# Patient Record
Sex: FEMALE | Race: WHITE | NOT HISPANIC OR LATINO | Employment: FULL TIME | URBAN - METROPOLITAN AREA
[De-identification: names, ages, dates, MRNs, and addresses within clinical notes are randomized per-mention and may not be internally consistent; named-entity substitution may affect disease eponyms.]

---

## 2017-12-20 ENCOUNTER — TRANSCRIBE ORDERS (OUTPATIENT)
Dept: URGENT CARE | Facility: CLINIC | Age: 52
End: 2017-12-20
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
## 2017-12-20 DIAGNOSIS — S83.511A SPRAIN OF ANTERIOR CRUCIATE LIGAMENT OF RIGHT KNEE, INITIAL ENCOUNTER: ICD-10-CM

## 2017-12-20 DIAGNOSIS — S16.1XXA REPETITIVE STRAIN INJURY OF CERVICAL SPINE, INITIAL ENCOUNTER: ICD-10-CM

## 2017-12-20 DIAGNOSIS — S46.811A TRAPEZIUS STRAIN, RIGHT, INITIAL ENCOUNTER: ICD-10-CM

## 2017-12-20 DIAGNOSIS — S46.911A RIGHT SHOULDER STRAIN, INITIAL ENCOUNTER: Primary | ICD-10-CM

## 2018-01-08 ENCOUNTER — TREATMENT (OUTPATIENT)
Dept: PHYSICAL THERAPY | Facility: CLINIC | Age: 53
End: 2018-01-08

## 2018-01-08 DIAGNOSIS — M25.511 ACUTE PAIN OF RIGHT SHOULDER: Primary | ICD-10-CM

## 2018-01-08 PROCEDURE — 97530 THERAPEUTIC ACTIVITIES: CPT | Performed by: PHYSICAL THERAPIST

## 2018-01-08 PROCEDURE — 97140 MANUAL THERAPY 1/> REGIONS: CPT | Performed by: PHYSICAL THERAPIST

## 2018-01-08 PROCEDURE — 97001 PR PHYS THERAPY EVALUATION: CPT | Performed by: PHYSICAL THERAPIST

## 2018-01-08 PROCEDURE — 97035 APP MDLTY 1+ULTRASOUND EA 15: CPT | Performed by: PHYSICAL THERAPIST

## 2018-01-08 PROCEDURE — 97110 THERAPEUTIC EXERCISES: CPT | Performed by: PHYSICAL THERAPIST

## 2018-01-08 NOTE — PROGRESS NOTES
Physical Therapy Initial Evaluation and Plan of Care        Subjective Evaluation    History of Present Illness  Date of onset: 12/18/2017  Mechanism of injury: Patient reports that she was helping a coworker with an overhead tasks and the unit gave way and it hyperextended her arm backwards. Overall feeling better but still not 100%      Patient Occupation: [employer] Quality of life: good    Pain  Current pain rating: 3  Quality: dull ache and squeezing  Aggravating factors: overhead activity, outstretched reach, repetitive movement and lifting    Hand dominance: right    Treatments  Previous treatment: medication  Patient Goals  Patient goals for therapy: return to work, independence with ADLs/IADLs, increased motion, decreased pain and increased strength             Objective       Postural Observations  Seated posture: fair  Standing posture: fair  Correction of posture: makes symptoms worse        Cervical/Thoracic Screen   Cervical range of motion within normal limits with the following exceptions: Mild pain at end cervical range specifically in flexion and left rotation/sidebending.     Active Range of Motion     Right Shoulder   Flexion: 150 degrees   Abduction: 150 degrees   External rotation 90°: 45 degrees  Internal rotation 90°: 45 degrees     Scapular Mobility     Right Shoulder   Scapular mobility: fair    Joint Play     Right Shoulder  Hypomobile in the posterior capsule and inferior capsule.     Strength/Myotome Testing     Right Shoulder     Planes of Motion   Flexion: 4+   Abduction: 4-   External rotation at 0°: 4-   Internal rotation at 0°: 4+     Tests     Right Shoulder   Positive active compression ([place]) and painful arc.   Negative empty can, full can and Neer's.          Assessment & Plan     Assessment  Impairments: abnormal or restricted ROM, activity intolerance, lacks appropriate home exercise program and pain with function  Assessment details: Patient is a 52 year old male who  presents with increased right shoulder pain. Upon assessment she has bilateral rounded shoulders, inferior and posterior capsular tightness, end range of motion limitation, weakness noted in abduction and ER.  +compression, -Full can  Prognosis: good  Prognosis details: Long Term Goals (4 weeks)    Patient is able to return to work with minimal to no discomfort  Patient is able to lift 45 lbs from floor to waist  Patient is able to lift 25 lbs from waist to shoulder  Patient is able to lift 15 lbs from shoulder to overhead      Short Term Goals (2 weeks)    Patient is independent with HEP  Patient is able to sleep without being disrupted by pain.   Patient has full AROM/PROM of right shoulder  Patient has greater than 50% improvement overall.   Patient is able to reach into overhead cabinets with minimal discomfort            Functional Limitations: lifting, pulling, pushing and reaching overhead  Plan  Therapy options: will be seen for skilled physical therapy services  Planned modality interventions: TENS, ultrasound, thermotherapy (hydrocollator packs) and cryotherapy  Planned therapy interventions: manual therapy, soft tissue mobilization, strengthening, stretching, therapeutic activities, joint mobilization, home exercise program, functional ROM exercises, flexibility and body mechanics training  Frequency: 3x week  Duration in weeks: 4  Treatment plan discussed with: patient        Manual Therapy:    10     mins  70187;  Therapeutic Exercise:    20     mins  57799;     Ultrasound  8 minutes      Timed Treatment:   38   mins   Total Treatment:     60   mins    PT SIGNATURE: Aleksandra Chowdhury, PT   DATE TREATMENT INITIATED: 1/8/2018    Initial Certification  Certification Period: 4/8/2018  I certify that the therapy services are furnished while this patient is under my care.  The services outlined above are required by this patient, and will be reviewed every 90 days.     PHYSICIAN: Christina Ayers,  MD      DATE:     Please sign and return via fax to [phone].. Thank you, [facility].

## 2018-01-10 ENCOUNTER — TREATMENT (OUTPATIENT)
Dept: PHYSICAL THERAPY | Facility: CLINIC | Age: 53
End: 2018-01-10

## 2018-01-10 DIAGNOSIS — M25.511 ACUTE PAIN OF RIGHT SHOULDER: Primary | ICD-10-CM

## 2018-01-10 PROCEDURE — 97140 MANUAL THERAPY 1/> REGIONS: CPT | Performed by: PHYSICAL THERAPIST

## 2018-01-10 PROCEDURE — 97035 APP MDLTY 1+ULTRASOUND EA 15: CPT | Performed by: PHYSICAL THERAPIST

## 2018-01-10 PROCEDURE — 97110 THERAPEUTIC EXERCISES: CPT | Performed by: PHYSICAL THERAPIST

## 2018-01-10 NOTE — PROGRESS NOTES
Re-Assessment / Re-Certification    Time In 137 Time Out 218    Patient: Roula Willard   : 1965  Diagnosis/ICD-10 Code:  Acute pain of right shoulder [M25.511]  Referring practitioner: Christina Ayers MD  Date of Initial Visit: 1/10/2018  Today's Date: 1/10/2018  Patient seen for 2 sessions      Subjective:   Roula Willard reports: that the shoulder feels better, states that it waked her up at night (pain 8-9/10), rates the pain at 3-4/10.    Treatment has included: therapeutic exercise, manual therapy and ultrasound    Subjective   Objective       Palpation     Additional Palpation Details  TTP to right LHB tendon, SS tendon and to the posterior shoulder    Active Range of Motion     Right Shoulder   Flexion: 142 degrees   Abduction: 141 degrees   External rotation 0°: 63 degrees   Internal rotation BTB: T8     Additional Active Range of Motion Details  Pain with all planes    Strength/Myotome Testing     Right Shoulder     Planes of Motion   Flexion: 4   Abduction: 4+   External rotation at 0°: 4   Internal rotation at 0°: 5     Tests     Right Shoulder   Positive active compression ([place]), empty can and Hawkin's.      Assessment/Plan  Patient has tolerated PT well thus far, but no significant progress has been made secondary to only being seen 2 times in PT.  Deficits persist with regard to pain, TTP, AROM, special testing and function.      PT Signature: James Leary, PT        Manual Therapy:    8     mins  51284;  Therapeutic Exercise:    24     mins  46588;     Neuromuscular Jamar:    0    mins  73695;    Therapeutic Activity:     0     mins  46604;     Gait Trainin     mins  60322;     Ultrasound:     8     mins  70121;    Work Hardening           0      mins 73620  Iontophoresis               0   mins 80048    Timed Treatment:   40   mins   Total Treatment:     41   mins

## 2018-01-31 ENCOUNTER — DOCUMENTATION (OUTPATIENT)
Dept: PHYSICAL THERAPY | Facility: CLINIC | Age: 53
End: 2018-01-31

## 2018-01-31 NOTE — PROGRESS NOTES
Discharge Summary  Discharge Summary from Physical Therapy Report      Dates  PT visit: 1/8 to 1/10/18  Number of Visits: 2     Discharge Status of Patient: See MD Note dated 1/10/18    Goals: Not Met    Discharge Plan: Refer to other services (specify):likely MRI    Comments Patient did not return to PT, likely referred out for MRI.    Date of Discharge 1/31/18        James Leary, PT  Physical Therapist

## 2018-02-12 ENCOUNTER — TRANSCRIBE ORDERS (OUTPATIENT)
Dept: PHYSICAL THERAPY | Facility: CLINIC | Age: 53
End: 2018-02-12

## 2018-02-12 DIAGNOSIS — S42.295D TRAUMATIC CLOSED NONDISPLACED FRACTURE OF ANATOMICAL NECK OF HUMERUS WITH ROUTINE HEALING, LEFT: Primary | ICD-10-CM

## 2018-03-02 ENCOUNTER — TREATMENT (OUTPATIENT)
Dept: PHYSICAL THERAPY | Facility: CLINIC | Age: 53
End: 2018-03-02

## 2018-03-02 DIAGNOSIS — M25.511 RIGHT SHOULDER PAIN, UNSPECIFIED CHRONICITY: ICD-10-CM

## 2018-03-02 DIAGNOSIS — M54.2 PAIN, NECK: Primary | ICD-10-CM

## 2018-03-02 PROCEDURE — 97110 THERAPEUTIC EXERCISES: CPT | Performed by: PHYSICAL THERAPIST

## 2018-03-02 PROCEDURE — 97002 PR PHYS THERAPY RE-EVALUATION: CPT | Performed by: PHYSICAL THERAPIST

## 2018-03-02 PROCEDURE — 97014 ELECTRIC STIMULATION THERAPY: CPT | Performed by: PHYSICAL THERAPIST

## 2018-03-02 NOTE — PROGRESS NOTES
Physical Therapy Initial Evaluation and Plan of Care    Time In  331  Time Out  415    Subjective Evaluation    History of Present Illness  Date of onset: 2017  Mechanism of injury: Patient was working [place] and something heavy came down on right shoulder.      Patient Occupation: [employer]   Precautions and Work Restrictions: normal job dutiesPain  Current pain ratin  At worst pain ratin  Location: right posterior shoulder and scaplua  Quality: knife-like and throbbing  Relieving factors: rest  Aggravating factors: overhead activity  Progression: improved    Hand dominance: right    Diagnostic Tests  MRI studies: abnormal (2 small tears)    Treatments  Previous treatment: injection treatment  Patient Goals  Patient goal: get rid of pain           Objective       Palpation     Additional Palpation Details  TTP to right posterior RC tendons, LHB and SS tendons.    Active Range of Motion     Right Shoulder   Flexion: 133 degrees with pain  Abduction: 120 degrees with pain  External rotation 0°: 72 degrees   Internal rotation BTB: L1 with pain    Strength/Myotome Testing     Right Shoulder     Planes of Motion   Flexion: 4+   Abduction: 5   External rotation at 0°: 4-   Internal rotation at 0°: 4-     Tests     Right Shoulder   Positive active compression ([place]), empty can and Hawkin's.          Assessment & Plan     Assessment  Impairments: abnormal or restricted ROM, activity intolerance, impaired physical strength, lacks appropriate home exercise program and pain with function  Assessment details: Patient presents with c/o pain, TTP, limited AROM, decreased strength and positive special testing which is limiting her ability to perform ADL'S.  Barriers to therapy: none  Prognosis: good  Prognosis details: STG's to be met by 2 weeks  1)  Independent with HEP  2)  Decrease pain by 50% or more  3)  AROM WNL for right shoulder without pain    LTG's to be met by 4 weeks  1)  Independent with HEP progression  2)   Decrease pain by 75% or more  3)  Increase strength for right shoulder to 4+/5  4)  Negative special testing  5)  No TTP to right shoulder  6)  Patient to lift waist to shoulder up to 30 lbs  7)  Patient to perform ADL'S without limitations       Plan  Therapy options: will be seen for skilled physical therapy services  Planned modality interventions: ultrasound, TENS and cryotherapy  Planned therapy interventions: strengthening, stretching, therapeutic activities, home exercise program and manual therapy  Frequency: 3x week  Duration in weeks: 8  Treatment plan discussed with: patient        Manual Therapy:    0     mins  76051;  Therapeutic Exercise:    19     mins  55231;     Neuromuscular Jamar:    0    mins  96155;    Therapeutic Activity:     0     mins  93092;     Gait Trainin     mins  38673;     Ultrasound:     0     mins  51992;    Work Hardening           0      mins 59778  Iontophoresis               0   mins 13051    Timed Treatment:   19   mins   Total Treatment:     44   mins    PT SIGNATURE: James Leary, PT   DATE TREATMENT INITIATED: 3/2/2018    Initial Certification  Certification Period: 2018  I certify that the therapy services are furnished while this patient is under my care.  The services outlined above are required by this patient, and will be reviewed every 90 days.     PHYSICIAN: Benjamin Mays MD      DATE:     Please sign and return via fax to [phone].. Thank you, [facility].

## 2018-03-07 ENCOUNTER — TREATMENT (OUTPATIENT)
Dept: PHYSICAL THERAPY | Facility: CLINIC | Age: 53
End: 2018-03-07

## 2018-03-07 DIAGNOSIS — M54.2 PAIN, NECK: Primary | ICD-10-CM

## 2018-03-07 PROCEDURE — 97110 THERAPEUTIC EXERCISES: CPT | Performed by: PHYSICAL THERAPIST

## 2018-03-07 NOTE — PROGRESS NOTES
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  She states that most of the pain seems to be in her shoulder blade.         Objective   See Exercise, Manual, and Modality Logs for complete treatment.       Assessment & Plan     Assessment  Assessment details: Patient tolerated treatment fairly well.  She has significant tightness along upper trap and thoracic spine. Discussed pain potentially coming from thoracic spine and the need for rest and stretching and how excessively pulling, pushing and lifting are aggravating it.    Plan  Plan details: Progress as tolerated.                      Manual Therapy:    5     mins  84229;  Therapeutic Exercise:    30     mins  88567;       Timed Treatment:   35   mins   Total Treatment:     35   mins    Aleksandra Chowdhury, PT  Physical Therapist

## 2018-03-09 ENCOUNTER — TREATMENT (OUTPATIENT)
Dept: PHYSICAL THERAPY | Facility: CLINIC | Age: 53
End: 2018-03-09

## 2018-03-09 DIAGNOSIS — M54.2 PAIN, NECK: Primary | ICD-10-CM

## 2018-03-09 DIAGNOSIS — M25.511 RIGHT SHOULDER PAIN, UNSPECIFIED CHRONICITY: ICD-10-CM

## 2018-03-09 PROCEDURE — 97110 THERAPEUTIC EXERCISES: CPT | Performed by: PHYSICAL THERAPIST

## 2018-03-09 PROCEDURE — 97014 ELECTRIC STIMULATION THERAPY: CPT | Performed by: PHYSICAL THERAPIST

## 2018-03-09 NOTE — PROGRESS NOTES
Physical Therapy Daily Progress Note    Time In 134  Time Out 227        Subjective  Patient reports that the shoulder is feeling a lot better, but reports that the shoulder blade is bothering her (pain rated at 7-8/10).    Objective   See Exercise, Manual, and Modality Logs for complete treatment.       Assessment/Plan  Subjective reports of pain remain elevated.  The majority of the patient's c/o is with the scapula, not the right shoulder or cervical spine.  No significant increase in the routine today secondary to increased subjective c/o today.                   Manual Therapy:    0     mins  74125;  Therapeutic Exercise:    38     mins  97942;     Neuromuscular Jamar:    0    mins  60688;    Therapeutic Activity:     0     mins  64591;     Gait Trainin     mins  97343;     Ultrasound:     0     mins  22434;    Work Hardening           0      mins 56397  Iontophoresis               0   mins 13248    Timed Treatment:   38   mins   Total Treatment:     53   mins    James Leary, PT  Physical Therapist

## 2018-03-12 ENCOUNTER — TREATMENT (OUTPATIENT)
Dept: PHYSICAL THERAPY | Facility: CLINIC | Age: 53
End: 2018-03-12

## 2018-03-12 DIAGNOSIS — M25.511 RIGHT SHOULDER PAIN, UNSPECIFIED CHRONICITY: ICD-10-CM

## 2018-03-12 DIAGNOSIS — M54.2 PAIN, NECK: Primary | ICD-10-CM

## 2018-03-12 PROCEDURE — 97014 ELECTRIC STIMULATION THERAPY: CPT | Performed by: PHYSICAL THERAPIST

## 2018-03-12 PROCEDURE — 97110 THERAPEUTIC EXERCISES: CPT | Performed by: PHYSICAL THERAPIST

## 2018-03-12 NOTE — PROGRESS NOTES
Physical Therapy Daily Progress Note    Time In 145  Time Out 232        Subjective  Patient reports that the shoulder blade and shoulder are bothering her today, currently rates the pain at 6-7/10 in the shoulder.    Objective   See Exercise, Manual, and Modality Logs for complete treatment.       Assessment/Plan  Subjective reports of pain remain elevated.  Patient reported pain in the superior aspect of the shoulder and scapula with some of the exercises.                     Manual Therapy:    0     mins  33372;  Therapeutic Exercise:    30     mins  70246;     Neuromuscular Jamar:    0    mins  77605;    Therapeutic Activity:     0     mins  10028;     Gait Trainin     mins  06887;     Ultrasound:     0     mins  97837;    Work Hardening           0      mins 14130  Iontophoresis               0   mins 50040    Timed Treatment:   30   mins   Total Treatment:     47   mins    James Leary, PT  Physical Therapist

## 2018-03-14 ENCOUNTER — TREATMENT (OUTPATIENT)
Dept: PHYSICAL THERAPY | Facility: CLINIC | Age: 53
End: 2018-03-14

## 2018-03-14 DIAGNOSIS — M54.2 PAIN, NECK: ICD-10-CM

## 2018-03-14 DIAGNOSIS — M25.511 RIGHT SHOULDER PAIN, UNSPECIFIED CHRONICITY: Primary | ICD-10-CM

## 2018-03-14 PROCEDURE — 97110 THERAPEUTIC EXERCISES: CPT | Performed by: PHYSICAL THERAPIST

## 2018-03-14 PROCEDURE — 97014 ELECTRIC STIMULATION THERAPY: CPT | Performed by: PHYSICAL THERAPIST

## 2018-03-14 NOTE — PROGRESS NOTES
Physical Therapy Daily Progress Note    Time In 131  Time Out 222        Subjective  Patient reports that she is feeling pretty good secondary to not doing a whole lot of work today, rates the pain at 3-4/10.    Objective   See Exercise, Manual, and Modality Logs for complete treatment.       Assessment/Plan  Patient reported an increase in pain in the right scapula with the pec st and wall slides.  Patient had increased pain and spasm with the bent over row after just 2 reps.  Patient continues to have c/o pain in the right scapula without much improvement thus far.                     Manual Therapy:    0     mins  52860;  Therapeutic Exercise:    31     mins  30177;     Neuromuscular Jamar:    0    mins  74344;    Therapeutic Activity:     0     mins  20651;     Gait Trainin     mins  44407;     Ultrasound:     0     mins  80715;    Work Hardening           0      mins 01703  Iontophoresis               0   mins 22997    Timed Treatment:   31   mins   Total Treatment:     51   mins    James Leary, PT  Physical Therapist

## 2018-03-16 ENCOUNTER — TREATMENT (OUTPATIENT)
Dept: PHYSICAL THERAPY | Facility: CLINIC | Age: 53
End: 2018-03-16

## 2018-03-16 DIAGNOSIS — M54.2 PAIN, NECK: ICD-10-CM

## 2018-03-16 DIAGNOSIS — M25.511 RIGHT SHOULDER PAIN, UNSPECIFIED CHRONICITY: Primary | ICD-10-CM

## 2018-03-16 PROCEDURE — 97014 ELECTRIC STIMULATION THERAPY: CPT | Performed by: PHYSICAL THERAPIST

## 2018-03-16 PROCEDURE — 97110 THERAPEUTIC EXERCISES: CPT | Performed by: PHYSICAL THERAPIST

## 2018-03-16 NOTE — PROGRESS NOTES
Physical Therapy Daily Progress Note    Time In 133  Time Out 223        Subjective  Patient reports that she is hurting more today in the shoulder blade, rates the pain at 6-7/10.    Objective   See Exercise, Manual, and Modality Logs for complete treatment.       Assessment/Plan  Subjective reports remain elevated.  Patient continues to have increased pain in the right scapula with activity.  No increase in the routine today and the scapular strengthening exercises were held.  No significant improvement thus far with PT.                    Manual Therapy:    0     mins  82453;  Therapeutic Exercise:    31     mins  56629;     Neuromuscular Jamar:    0    mins  85563;    Therapeutic Activity:     0     mins  16945;     Gait Trainin     mins  71430;     Ultrasound:     0     mins  76282;    Work Hardening           0      mins 06362  Iontophoresis               0   mins 33293    Timed Treatment:   31   mins   Total Treatment:     50   mins    James Leary, PT  Physical Therapist

## 2018-03-19 ENCOUNTER — TREATMENT (OUTPATIENT)
Dept: PHYSICAL THERAPY | Facility: CLINIC | Age: 53
End: 2018-03-19

## 2018-03-19 DIAGNOSIS — M25.511 RIGHT SHOULDER PAIN, UNSPECIFIED CHRONICITY: Primary | ICD-10-CM

## 2018-03-19 PROCEDURE — 97110 THERAPEUTIC EXERCISES: CPT | Performed by: PHYSICAL THERAPIST

## 2018-03-19 PROCEDURE — 97014 ELECTRIC STIMULATION THERAPY: CPT | Performed by: PHYSICAL THERAPIST

## 2018-03-19 NOTE — PROGRESS NOTES
Physical Therapy Daily Progress Note    Time In 141  Time Out 227        Subjective  Patient reports no change in the shoulder blade.    Objective   See Exercise, Manual, and Modality Logs for complete treatment.       Assessment/Plan  Patient reported more pain in the scapula with the routine today, secondary to using it more at work today and aggravating it.  Held some of the exercises today secondary to increased pain.  No significant improvements thus far with PT.      Plan  Assess at next treatment for MD f/u.               Manual Therapy:    0     mins  24218;  Therapeutic Exercise:    29     mins  35361;     Neuromuscular Jamar:    0    mins  37401;    Therapeutic Activity:     0     mins  05420;     Gait Trainin     mins  85020;     Ultrasound:     0     mins  86791;    Work Hardening           0      mins 97108  Iontophoresis               0   mins 08033    Timed Treatment:   29   mins   Total Treatment:     46   mins    James Leary, PT  Physical Therapist

## 2018-03-21 ENCOUNTER — TREATMENT (OUTPATIENT)
Dept: PHYSICAL THERAPY | Facility: CLINIC | Age: 53
End: 2018-03-21

## 2018-03-21 DIAGNOSIS — M25.511 RIGHT SHOULDER PAIN, UNSPECIFIED CHRONICITY: Primary | ICD-10-CM

## 2018-03-21 PROCEDURE — 97014 ELECTRIC STIMULATION THERAPY: CPT | Performed by: PHYSICAL THERAPIST

## 2018-03-21 PROCEDURE — 97110 THERAPEUTIC EXERCISES: CPT | Performed by: PHYSICAL THERAPIST

## 2018-03-21 NOTE — PROGRESS NOTES
Re-Assessment / Re-Certification    Time In 138 Time Out 227    Patient: Roula Willard   : 1965  Diagnosis/ICD-10 Code:  Right shoulder pain, unspecified chronicity [M25.511]  Referring practitioner: Benjamin Mays MD  Date of Initial Visit: 3/21/2018  Today's Date: 3/21/2018  Patient seen for 8 sessions      Subjective:   Roula Willard reports: pain in the shoulder blade rated at 4-5/10 but 8-9/10 earlier before she took her medicine, patient reports an aggravating pain in the shoulder (3-4/10).    Treatment has included: therapeutic exercise, electrical stimulation and moist heat    Subjective   Objective       Palpation     Additional Palpation Details  TTP to right LHB tendon, SS tendon, UT and especially to the medial border of the scapula.    Active Range of Motion     Right Shoulder   Flexion: 136 degrees with pain  Abduction: 146 degrees with pain  External rotation 0°: 76 degrees with pain  Internal rotation BTB: T10     Strength/Myotome Testing     Right Shoulder     Planes of Motion   Flexion: 4   Abduction: 4+   External rotation at 0°: 4-   Internal rotation at 0°: 4+     Isolated Muscles   Supraspinatus: 4-     Tests     Right Shoulder   Positive active compression ([place]) and Hawkin's.   Negative empty can.      Assessment/Plan  Patient has tolerated PT moderately well thus far.  Exercise progression continues to be limited secondary to increased c/o pain in the right scapular region.  Patient has consistently reported pain in the right scapula, stating that it feels knife-like.  Deficits persist with regard to pain, TTP, AROM, strength, special testing and function.      Feel that further medical management is needed secondary to lack of improvement with PT.      PT Signature: James Leary, PT        Manual Therapy:    0     mins  82020;  Therapeutic Exercise:    31     mins  18322;    Neuromuscular Jamar:    0    mins  10590;    Therapeutic Activity:     0     mins   27815;     Gait Trainin     mins  46279;     Ultrasound:     0     mins  38766;    Work Hardening           0      mins 37060  Iontophoresis               0   mins 82161    Timed Treatment:   31   mins   Total Treatment:     49   mins

## 2018-04-11 ENCOUNTER — DOCUMENTATION (OUTPATIENT)
Dept: PHYSICAL THERAPY | Facility: CLINIC | Age: 53
End: 2018-04-11

## 2018-04-11 NOTE — PROGRESS NOTES
Discharge Summary  Discharge Summary from Physical Therapy Report      Dates  PT visit: 3/2 to 3/21/18  Number of Visits: 8     Discharge Status of Patient: See MD Note dated 3/21/18    Goals: Partially Met    Discharge Plan: Continue with current home exercise program as instructed    Comments Patient did not return, possibly scheduled for surgery.    Date of Discharge 4/11/18        James Leary, PT  Physical Therapist

## 2018-05-08 ENCOUNTER — TREATMENT (OUTPATIENT)
Dept: PHYSICAL THERAPY | Facility: CLINIC | Age: 53
End: 2018-05-08

## 2018-05-08 DIAGNOSIS — M25.511 RIGHT SHOULDER PAIN, UNSPECIFIED CHRONICITY: Primary | ICD-10-CM

## 2018-05-08 PROCEDURE — 97140 MANUAL THERAPY 1/> REGIONS: CPT | Performed by: PHYSICAL THERAPIST

## 2018-05-08 PROCEDURE — 97110 THERAPEUTIC EXERCISES: CPT | Performed by: PHYSICAL THERAPIST

## 2018-05-08 PROCEDURE — 97002 PR PHYS THERAPY RE-EVALUATION: CPT | Performed by: PHYSICAL THERAPIST

## 2018-05-09 NOTE — PROGRESS NOTES
Re-Assessment / Re-Certification        Patient: Roula Willard   : 1965  Diagnosis/ICD-10 Code:  Right shoulder pain, unspecified chronicity [M25.511]  Referring practitioner: Benjamin Mays MD  Patient seen for Visit count could not be calculated. Make sure you are using a visit which is associated with an episode. sessions      Subjective:   Roula Willard reports: Patient reports that she still has not been getting any better.     Clinical Progress: improved  Home Program Compliance: Yes  Treatment has included: therapeutic exercise, manual therapy and electrical stimulation    Subjective   Objective       Palpation     Additional Palpation Details  TTP to right LHB tendon, SS tendon, UT and especially to the medial border of the scapula.    Active Range of Motion     Right Shoulder   Flexion: 136 degrees with pain  Abduction: 146 degrees with pain  External rotation 0°:  76 degrees with pain  Internal rotation BTB: T10     Strength/Myotome Testing     Right Shoulder     Planes of Motion   Flexion: 4   Abduction: 4+   External rotation at 0°:  4-   Internal rotation at 0°:  4+     Isolated Muscles   Supraspinatus: 4-     Tests     Right Shoulder   Positive active compression ([place]) and Hawkin's.   Negative empty can.      Assessment & Plan     Assessment  Impairments: abnormal or restricted ROM, activity intolerance, lacks appropriate home exercise program and pain with function  Assessment details: Patient is a 52 year old female who presents with increased right shoulder pain following a work related injury.  She has attended 12 physical therapy sessions thus far.  She was referred to  see orthopedic and returned to physical therapy.  Patient states that it is better however slow progress and is frustrated that she is not better by now.  Upon re evaluation she has improved AROM however still has limitations above shoulder level.  Returned to baseline exercises which she tolerated  well.    Prognosis: good  Prognosis details: Long Term Goals (4 weeks)    Patient is able to return to work with minimal to no discomfort  Patient is able to lift 45 lbs from floor to waist  Patient is able to lift 25 lbs from waist to shoulder  Patient is able to lift 15 lbs from shoulder to overhead      Short Term Goals (2 weeks)    Patient is independent with HEP  Patient is able to sleep without being disrupted by pain.   Patient has full AROM/PROM of right shoulder  Patient has greater than 50% improvement overall.   Patient is able to reach into overhead cabinets with minimal discomfort            Functional Limitations: lifting, pulling, pushing and reaching overhead  Plan  Therapy options: will be seen for skilled physical therapy services  Planned modality interventions: TENS, ultrasound, thermotherapy (hydrocollator packs) and cryotherapy  Planned therapy interventions: manual therapy, soft tissue mobilization, strengthening, stretching, therapeutic activities, joint mobilization, home exercise program, functional ROM exercises, flexibility and body mechanics training  Frequency: 3x week  Duration in weeks: 4  Treatment plan discussed with: patient      Progress toward previous goals: Partially Met            PT Signature: Aleksandra Chowdhury, PT          Therapeutic Exercise:    25     mins  51626;     Manual Therapy  10 minutes    Timed Treatment:   35   mins   Total Treatment:     55   mins

## 2018-05-10 ENCOUNTER — TREATMENT (OUTPATIENT)
Dept: PHYSICAL THERAPY | Facility: CLINIC | Age: 53
End: 2018-05-10

## 2018-05-10 DIAGNOSIS — M25.511 RIGHT SHOULDER PAIN, UNSPECIFIED CHRONICITY: Primary | ICD-10-CM

## 2018-05-10 PROCEDURE — 97110 THERAPEUTIC EXERCISES: CPT | Performed by: PHYSICAL THERAPIST

## 2018-05-10 PROCEDURE — 97140 MANUAL THERAPY 1/> REGIONS: CPT | Performed by: PHYSICAL THERAPIST

## 2018-05-10 PROCEDURE — 97014 ELECTRIC STIMULATION THERAPY: CPT | Performed by: PHYSICAL THERAPIST

## 2018-05-10 NOTE — PROGRESS NOTES
Physical Therapy Daily Progress Note    Time In 244  Time Out 343        Subjective  Patient reports that the shoulder hurts worse in the shoulder, rates the pain at 7-8/10.    Objective   See Exercise, Manual, and Modality Logs for complete treatment.       Assessment/Plan  There has been no significant change in the patient's S/S's.  Patient was scheduled for an MRI that was approved, but then it got denied.  No significant improvements have been made with PT.  Exercise progression continues to be difficult secondary to persistent pain and limitations in ROM and strength.                     Manual Therapy:    8     mins  14136;  Therapeutic Exercise:    31     mins  82716;     Neuromuscular Jamar:    0    mins  33889;    Therapeutic Activity:     0     mins  83744;     Gait Trainin     mins  65788;     Ultrasound:     0     mins  18594;    Work Hardening           0      mins 51662  Iontophoresis               0   mins 32551    Timed Treatment:   38   mins   Total Treatment:     59   mins    James Leary, PT  Physical Therapist

## 2018-05-11 ENCOUNTER — TREATMENT (OUTPATIENT)
Dept: PHYSICAL THERAPY | Facility: CLINIC | Age: 53
End: 2018-05-11

## 2018-05-11 DIAGNOSIS — M25.511 RIGHT SHOULDER PAIN, UNSPECIFIED CHRONICITY: Primary | ICD-10-CM

## 2018-05-11 PROCEDURE — 97110 THERAPEUTIC EXERCISES: CPT | Performed by: PHYSICAL THERAPIST

## 2018-05-11 PROCEDURE — 97014 ELECTRIC STIMULATION THERAPY: CPT | Performed by: PHYSICAL THERAPIST

## 2018-05-11 NOTE — PROGRESS NOTES
Physical Therapy Daily Progress Note    Time In 232  Time Out 327        Subjective  Patient reports that the shoulder is about the same.      Objective   See Exercise, Manual, and Modality Logs for complete treatment.       Assessment/Plan  Patient has had no significant improvements thus far with PT.  Pain persists in the right shoulder and scapula region.  Conservative treatment has no yielded significant benefits thus far.  Patient stated that she will call and get an appointment with the MD next week.  Feel that the patient needs further medical management at this time.                     Manual Therapy:    0     mins  51706;  Therapeutic Exercise:    38     mins  72751;     Neuromuscular Jamar:    0    mins  32206;    Therapeutic Activity:     0     mins  59306;     Gait Trainin     mins  39099;     Ultrasound:     0     mins  70851;    Work Hardening           0      mins 75067  Iontophoresis               0   mins 07659    Timed Treatment:   38   mins   Total Treatment:     55   mins    James Leary, PT  Physical Therapist

## 2018-05-31 ENCOUNTER — DOCUMENTATION (OUTPATIENT)
Dept: PHYSICAL THERAPY | Facility: CLINIC | Age: 53
End: 2018-05-31

## 2018-05-31 NOTE — PROGRESS NOTES
Discharge Summary  Discharge Summary from Physical Therapy Report      Dates  PT visit: 5/8 to 5/11/18  Number of Visits: 3     Discharge Status of Patient: See last daily note.    Goals: Partially Met    Discharge Plan: Continue with current home exercise program as instructed    Comments Patient will return to ortho and continue as directed.    Date of Discharge 5/31/18        James Leary, PT  Physical Therapist

## 2020-07-09 ENCOUNTER — TRANSCRIBE ORDERS (OUTPATIENT)
Dept: ADMINISTRATIVE | Facility: HOSPITAL | Age: 55
End: 2020-07-09

## 2020-07-09 ENCOUNTER — TRANSCRIBE ORDERS (OUTPATIENT)
Dept: CARDIOLOGY | Facility: HOSPITAL | Age: 55
End: 2020-07-09

## 2020-07-09 ENCOUNTER — LAB (OUTPATIENT)
Dept: LAB | Facility: HOSPITAL | Age: 55
End: 2020-07-09

## 2020-07-09 ENCOUNTER — HOSPITAL ENCOUNTER (OUTPATIENT)
Dept: CARDIOLOGY | Facility: HOSPITAL | Age: 55
Discharge: HOME | End: 2020-07-09
Admitting: SPECIALIST

## 2020-07-09 DIAGNOSIS — I10 ESSENTIAL HYPERTENSION, MALIGNANT: Primary | ICD-10-CM

## 2020-07-09 DIAGNOSIS — Z01.811 PRE-OP CHEST EXAM: Primary | ICD-10-CM

## 2020-07-09 DIAGNOSIS — I10 ESSENTIAL HYPERTENSION, MALIGNANT: ICD-10-CM

## 2020-07-09 LAB
ANION GAP SERPL CALCULATED.3IONS-SCNC: 11.4 MMOL/L (ref 5–15)
BUN SERPL-MCNC: 13 MG/DL (ref 6–20)
BUN/CREAT SERPL: 12.7 (ref 7–25)
CALCIUM SPEC-SCNC: 9.1 MG/DL (ref 8.6–10.5)
CHLORIDE SERPL-SCNC: 106 MMOL/L (ref 98–107)
CO2 SERPL-SCNC: 23.6 MMOL/L (ref 22–29)
CREAT SERPL-MCNC: 1.02 MG/DL (ref 0.57–1)
DEPRECATED RDW RBC AUTO: 45.2 FL (ref 37–54)
ERYTHROCYTE [DISTWIDTH] IN BLOOD BY AUTOMATED COUNT: 13 % (ref 12.3–15.4)
GFR SERPL CREATININE-BSD FRML MDRD: 56 ML/MIN/1.73
GLUCOSE SERPL-MCNC: 118 MG/DL (ref 65–99)
HCT VFR BLD AUTO: 36 % (ref 34–46.6)
HGB BLD-MCNC: 12.2 G/DL (ref 12–15.9)
MCH RBC QN AUTO: 32.2 PG (ref 26.6–33)
MCHC RBC AUTO-ENTMCNC: 33.9 G/DL (ref 31.5–35.7)
MCV RBC AUTO: 95 FL (ref 79–97)
PLATELET # BLD AUTO: 232 10*3/MM3 (ref 140–450)
PMV BLD AUTO: 10.5 FL (ref 6–12)
POTASSIUM SERPL-SCNC: 3.7 MMOL/L (ref 3.5–5.2)
RBC # BLD AUTO: 3.79 10*6/MM3 (ref 3.77–5.28)
SODIUM SERPL-SCNC: 141 MMOL/L (ref 136–145)
WBC # BLD AUTO: 6.18 10*3/MM3 (ref 3.4–10.8)

## 2020-07-09 PROCEDURE — 93010 ELECTROCARDIOGRAM REPORT: CPT | Performed by: INTERNAL MEDICINE

## 2020-07-09 PROCEDURE — 93005 ELECTROCARDIOGRAM TRACING: CPT

## 2020-07-09 PROCEDURE — 36415 COLL VENOUS BLD VENIPUNCTURE: CPT | Performed by: SPECIALIST

## 2020-07-09 PROCEDURE — 80048 BASIC METABOLIC PNL TOTAL CA: CPT

## 2020-07-09 PROCEDURE — 85027 COMPLETE CBC AUTOMATED: CPT | Performed by: SPECIALIST

## 2020-11-23 ENCOUNTER — HOSPITAL ENCOUNTER (OUTPATIENT)
Dept: CARDIOLOGY | Facility: HOSPITAL | Age: 55
Discharge: HOME | End: 2020-11-23

## 2020-11-23 ENCOUNTER — LAB (OUTPATIENT)
Dept: LAB | Facility: HOSPITAL | Age: 55
End: 2020-11-23

## 2020-11-23 ENCOUNTER — TRANSCRIBE ORDERS (OUTPATIENT)
Dept: ADMINISTRATIVE | Facility: HOSPITAL | Age: 55
End: 2020-11-23

## 2020-11-23 ENCOUNTER — TRANSCRIBE ORDERS (OUTPATIENT)
Dept: CARDIOLOGY | Facility: HOSPITAL | Age: 55
End: 2020-11-23

## 2020-11-23 DIAGNOSIS — I10 ESSENTIAL HYPERTENSION, MALIGNANT: ICD-10-CM

## 2020-11-23 DIAGNOSIS — I10 ESSENTIAL HYPERTENSION, MALIGNANT: Primary | ICD-10-CM

## 2020-11-23 DIAGNOSIS — Z01.811 PRE-OP CHEST EXAM: Primary | ICD-10-CM

## 2020-11-23 LAB
ANION GAP SERPL CALCULATED.3IONS-SCNC: 7.6 MMOL/L (ref 5–15)
BUN SERPL-MCNC: 17 MG/DL (ref 6–20)
BUN/CREAT SERPL: 17.3 (ref 7–25)
CALCIUM SPEC-SCNC: 10 MG/DL (ref 8.6–10.5)
CHLORIDE SERPL-SCNC: 104 MMOL/L (ref 98–107)
CO2 SERPL-SCNC: 30.4 MMOL/L (ref 22–29)
CREAT SERPL-MCNC: 0.98 MG/DL (ref 0.57–1)
DEPRECATED RDW RBC AUTO: 42.1 FL (ref 37–54)
ERYTHROCYTE [DISTWIDTH] IN BLOOD BY AUTOMATED COUNT: 12.5 % (ref 12.3–15.4)
GFR SERPL CREATININE-BSD FRML MDRD: 59 ML/MIN/1.73
GLUCOSE SERPL-MCNC: 91 MG/DL (ref 65–99)
HCT VFR BLD AUTO: 38.9 % (ref 34–46.6)
HGB BLD-MCNC: 13.2 G/DL (ref 12–15.9)
MCH RBC QN AUTO: 31.4 PG (ref 26.6–33)
MCHC RBC AUTO-ENTMCNC: 33.9 G/DL (ref 31.5–35.7)
MCV RBC AUTO: 92.6 FL (ref 79–97)
PLATELET # BLD AUTO: 255 10*3/MM3 (ref 140–450)
PMV BLD AUTO: 10.8 FL (ref 6–12)
POTASSIUM SERPL-SCNC: 3.9 MMOL/L (ref 3.5–5.2)
QT INTERVAL: 406 MS
RBC # BLD AUTO: 4.2 10*6/MM3 (ref 3.77–5.28)
SODIUM SERPL-SCNC: 142 MMOL/L (ref 136–145)
WBC # BLD AUTO: 5.83 10*3/MM3 (ref 3.4–10.8)

## 2020-11-23 PROCEDURE — 85027 COMPLETE CBC AUTOMATED: CPT

## 2020-11-23 PROCEDURE — 93005 ELECTROCARDIOGRAM TRACING: CPT

## 2020-11-23 PROCEDURE — 80048 BASIC METABOLIC PNL TOTAL CA: CPT

## 2020-11-23 PROCEDURE — 93010 ELECTROCARDIOGRAM REPORT: CPT | Performed by: INTERNAL MEDICINE

## 2020-11-23 PROCEDURE — 36415 COLL VENOUS BLD VENIPUNCTURE: CPT

## 2020-11-24 ENCOUNTER — LAB REQUISITION (OUTPATIENT)
Dept: LAB | Facility: HOSPITAL | Age: 55
End: 2020-11-24

## 2020-11-24 DIAGNOSIS — Z00.00 ENCOUNTER FOR GENERAL ADULT MEDICAL EXAMINATION WITHOUT ABNORMAL FINDINGS: ICD-10-CM

## 2020-11-28 PROCEDURE — U0004 COV-19 TEST NON-CDC HGH THRU: HCPCS | Performed by: SPECIALIST

## 2020-11-30 LAB — SARS-COV-2 RNA RESP QL NAA+PROBE: NOT DETECTED

## 2022-03-11 ENCOUNTER — OFFICE VISIT (OUTPATIENT)
Dept: ORTHOPEDIC SURGERY | Facility: CLINIC | Age: 57
End: 2022-03-11

## 2022-03-11 VITALS — TEMPERATURE: 98 F | HEIGHT: 67 IN | WEIGHT: 177 LBS | BODY MASS INDEX: 27.78 KG/M2

## 2022-03-11 DIAGNOSIS — M25.512 LEFT SHOULDER PAIN, UNSPECIFIED CHRONICITY: Primary | ICD-10-CM

## 2022-03-11 PROCEDURE — 73030 X-RAY EXAM OF SHOULDER: CPT | Performed by: ORTHOPAEDIC SURGERY

## 2022-03-11 PROCEDURE — 99203 OFFICE O/P NEW LOW 30 MIN: CPT | Performed by: ORTHOPAEDIC SURGERY

## 2022-03-11 RX ORDER — LEVOTHYROXINE SODIUM 88 UG/1
88 TABLET ORAL DAILY
COMMUNITY

## 2022-03-11 NOTE — PROGRESS NOTES
"  Patient: Roula Willard    YOB: 1965    Medical Record Number: [id number]    Chief Complaints: Left shoulder pain    History of Present Illness:     56 y.o. female patient who presents for her left shoulder.  She comes to me for second opinion.  She had an arthroscopic procedure in July 2020.  She says that they fix her biceps, labrum and rotator cuff.  We do not have the operative notes or any records from that procedure.  After that procedure, she continued to have pain and motion loss.  She was diagnosed with a frozen shoulder and underwent a manipulation in December 2020.  She tells me she has been \"miserable\" ever since.  She reports constant throbbing pain in the shoulder.  The pain is mostly in the front but occasionally radiates into her breast and occasionally into her chest.  She describes pain as moderate to severe, varying from 6-9 out of 10.  The pain is stabbing, grinding and aching.  She has noticed stiffness in the shoulder and very limited motion.  She reports associated weakness, clicking and popping.  She has had several injections which did not help significantly.  She has done extensive physical therapy.  She does me she cannot work.  She tells me she cannot do routine daily activities such as putting her hair in a ponytail.    Allergies:   Allergies   Allergen Reactions   • Latex Unknown - High Severity   • Penicillins Unknown - High Severity     Family allergy. Pt. Don't take this medication   • Iodinated Diagnostic Agents Rash       Home Medications:      Current Outpatient Medications:   •  levothyroxine (SYNTHROID, LEVOTHROID) 75 MCG tablet, Take 75 mcg by mouth Daily., Disp: , Rfl:     Past Medical History:   Diagnosis Date   • Allergic    • Arthritis        Past Surgical History:   Procedure Laterality Date   • SHOULDER SURGERY         Social History     Occupational History   • Not on file   Tobacco Use   • Smoking status: Never Smoker   • Smokeless tobacco: Never " "Used   Vaping Use   • Vaping Use: Never used   Substance and Sexual Activity   • Alcohol use: No   • Drug use: No   • Sexual activity: Defer      Social History     Social History Narrative   • Not on file       No family history on file.    Review of Systems:      Constitutional: Denies fever, shaking or chills   Eyes: Denies change in visual acuity   HEENT: Denies nasal congestion or sore throat   Respiratory: Denies cough or shortness of breath   Cardiovascular: Denies chest pain or edema  Endocrine: Denies tremors, palpitations, intolerance of heat or cold, polyuria, polydipsia.  GI: Denies abdominal pain, nausea, vomiting, bloody stools or diarrhea  : Denies frequency, urgency, incontinence, retention, or nocturia.  Musculoskeletal: Denies numbness, tingling or loss of motor function except as above  Integument: Denies rash, lesion or ulceration   Neurologic: Denies headache or focal weakness, deficits  Heme: Denies spontaneous or excessive bleeding, epistaxis, hematuria, melena, fatigue, enlarged or tender lymph nodes.      All other pertinent positives and negatives as noted above in HPI.    Physical Exam: 56 y.o. female    Vitals:    03/11/22 1352   Temp: 98 °F (36.7 °C)   Weight: 80.3 kg (177 lb)   Height: 170.2 cm (67\")       General:  Patient is awake and alert.  Appears in no acute distress or discomfort.    Psych:  Affect and demeanor are appropriate.    Eyes:  Conjunctiva and sclera appear grossly normal.  Eyes track well and EOM seem to be intact.    Ears:  No gross abnormalities.  Hearing adequate for the exam.    Cardiovascular:  Regular rate and rhythm.    Lungs:  Good chest expansion.  Breathing unlabored.    Lymph:  No palpable masses or adenopathy in the affected extremity    Extremities: Her left shoulder is examined.  Her surgical incisions are healed.  No atrophy, swelling or masses.  She has moderate tenderness anteriorly over the biceps and rotator interval.  No effusion or increased " warmth.  Both her active and passive motion are very limited.  Active forward elevation is about 140 degrees, external rotation roughly 40 degrees, internal rotation to her back pocket.  Passive motion is no better.  Basically all attempts at provocative testing are very painful for her including attempts at Neer, Jones, speeds and Bethel's maneuvers.  I could not get a good assessment of her strength with abduction or forward elevation due to pain and guarding.  With her elbow at the side, she can resist internal and external rotation with good strength and minimal pain.  She has intact motor and sensory function in her lower arm and hand.  Brisk capillary refill.         Radiology:   AP, scapular Y and axillary views left shoulder are ordered and reviewed to evaluate her complaint.  No comparison films are available.  She has moderate acromioclavicular arthritis.  I do not see any other significant findings.    She brought in a noncontrasted MRI from last year.  This was performed after her subsequent manipulation.  The MRI shows an anchor in the humeral head consistent with previous biceps tenodesis.  It appears that she has significant biceps tendinopathy.  It does appear that she has a partial-thickness articular sided rotator cuff tear which is bordering on 50% thickness.  No other significant findings.    Assessment/Plan: Left shoulder adhesive capsulitis status post previous arthroscopic procedure    She definitely has a frozen shoulder.  I think some of her pain is also probably coming from the biceps.  I cannot really tell about her rotator cuff.  We had a long discussion about options.  She feels a conservative treatment has failed and expressed a desire to move forward with what ever step is next.  I think she is headed towards another arthroscopy.  I want to get an MR arthrogram before we make any final decisions.  I did explain to her that we need to focus on getting her frozen shoulder better first  and then we can potentially address any other pathology.  All of her questions were answered in detail.  I will call her once I see the MRI results.  We will come up with a plan at that point.    Artie Sanchez MD    03/11/2022    CC to Waylon Jimenez MD

## 2022-03-18 ENCOUNTER — TELEPHONE (OUTPATIENT)
Dept: ORTHOPEDIC SURGERY | Facility: CLINIC | Age: 57
End: 2022-03-18

## 2022-03-18 NOTE — TELEPHONE ENCOUNTER
Caller: JABARI    Relationship: [facility] W/C-      Best call back number: [phone]  What form or medical record are you requesting: OV NOTES 03/11/2022 & MRI ORDERS    Who is requesting this form or medical record from you: W/C CARRIER    How would you like to receive the form or medical records (pick-up, mail, fax): FAX  If fax, what is the fax number: [phone] ATTN: [id number] (CLAIM #)    Timeframe paperwork needed: ASAP    Additional notes:

## 2022-03-24 ENCOUNTER — HOSPITAL ENCOUNTER (OUTPATIENT)
Dept: MRI IMAGING | Facility: HOSPITAL | Age: 57
Discharge: HOME | End: 2022-03-24

## 2022-03-24 ENCOUNTER — APPOINTMENT (OUTPATIENT)
Dept: OTHER | Facility: HOSPITAL | Age: 57
End: 2022-03-24

## 2022-03-24 ENCOUNTER — HOSPITAL ENCOUNTER (OUTPATIENT)
Dept: GENERAL RADIOLOGY | Facility: HOSPITAL | Age: 57
Discharge: HOME | End: 2022-03-24

## 2022-03-24 DIAGNOSIS — M25.512 LEFT SHOULDER PAIN, UNSPECIFIED CHRONICITY: ICD-10-CM

## 2022-03-24 DIAGNOSIS — Z09 FOLLOW UP: ICD-10-CM

## 2022-03-24 PROCEDURE — 77002 NEEDLE LOCALIZATION BY XRAY: CPT

## 2022-03-24 PROCEDURE — 0 GADOBENATE DIMEGLUMINE 529 MG/ML SOLUTION: Performed by: ORTHOPAEDIC SURGERY

## 2022-03-24 PROCEDURE — 73222 MRI JOINT UPR EXTREM W/DYE: CPT

## 2022-03-24 PROCEDURE — A9577 INJ MULTIHANCE: HCPCS | Performed by: ORTHOPAEDIC SURGERY

## 2022-03-24 PROCEDURE — 0 LIDOCAINE 1 % SOLUTION: Performed by: ORTHOPAEDIC SURGERY

## 2022-03-24 RX ORDER — LIDOCAINE HYDROCHLORIDE 10 MG/ML
10 INJECTION, SOLUTION INFILTRATION; PERINEURAL ONCE
Status: COMPLETED | OUTPATIENT
Start: 2022-03-24 | End: 2022-03-24

## 2022-03-24 RX ADMIN — GADOBENATE DIMEGLUMINE 0.05 ML: 529 INJECTION, SOLUTION INTRAVENOUS at 08:54

## 2022-03-24 RX ADMIN — LIDOCAINE HYDROCHLORIDE 3 ML: 10 INJECTION, SOLUTION INFILTRATION; PERINEURAL at 08:54

## 2022-03-25 ENCOUNTER — TELEPHONE (OUTPATIENT)
Dept: ORTHOPEDIC SURGERY | Facility: CLINIC | Age: 57
End: 2022-03-25

## 2022-03-25 NOTE — TELEPHONE ENCOUNTER
I spoke with her and gave her the MRI results.  We had a long conversation.  She had a lot of questions.  I explained that there are number of findings on the MRI that explain her symptoms.  She has a difficult problem and we need to have a more thorough discussion about her options.  I will have my office call her to make her an appointment.

## 2022-04-05 ENCOUNTER — APPOINTMENT (OUTPATIENT)
Dept: MRI IMAGING | Facility: HOSPITAL | Age: 57
End: 2022-04-05

## 2022-04-05 ENCOUNTER — APPOINTMENT (OUTPATIENT)
Dept: GENERAL RADIOLOGY | Facility: HOSPITAL | Age: 57
End: 2022-04-05

## 2022-04-11 ENCOUNTER — OFFICE VISIT (OUTPATIENT)
Dept: ORTHOPEDIC SURGERY | Facility: CLINIC | Age: 57
End: 2022-04-11

## 2022-04-11 VITALS — HEIGHT: 67 IN | TEMPERATURE: 97.8 F | WEIGHT: 178.1 LBS | BODY MASS INDEX: 27.95 KG/M2

## 2022-04-11 DIAGNOSIS — G89.29 CHRONIC LEFT SHOULDER PAIN: Primary | ICD-10-CM

## 2022-04-11 DIAGNOSIS — M75.02 ADHESIVE CAPSULITIS OF LEFT SHOULDER: ICD-10-CM

## 2022-04-11 DIAGNOSIS — M25.512 CHRONIC LEFT SHOULDER PAIN: Primary | ICD-10-CM

## 2022-04-11 PROCEDURE — 20610 DRAIN/INJ JOINT/BURSA W/O US: CPT | Performed by: ORTHOPAEDIC SURGERY

## 2022-04-11 PROCEDURE — 99213 OFFICE O/P EST LOW 20 MIN: CPT | Performed by: ORTHOPAEDIC SURGERY

## 2022-04-11 RX ORDER — METHYLPREDNISOLONE ACETATE 80 MG/ML
80 INJECTION, SUSPENSION INTRA-ARTICULAR; INTRALESIONAL; INTRAMUSCULAR; SOFT TISSUE
Status: COMPLETED | OUTPATIENT
Start: 2022-04-11 | End: 2022-04-11

## 2022-04-11 RX ADMIN — METHYLPREDNISOLONE ACETATE 80 MG: 80 INJECTION, SUSPENSION INTRA-ARTICULAR; INTRALESIONAL; INTRAMUSCULAR; SOFT TISSUE at 15:46

## 2022-04-11 NOTE — PROGRESS NOTES
Medical Record Number: [id number]    Chief Complaints: Follow-up regarding left shoulder pain    History of Present Illness:     56 y.o. female patient who presents for follow-up of the left shoulder.  She reports persistent pain and motion loss.  Conservative treatment has been ineffective thus far.  She recently got an MRI and presents today to review that study and discuss further options.    Allergies:   Allergies   Allergen Reactions   • Latex Unknown - High Severity   • Penicillins Unknown - High Severity     Family allergy. Pt. Don't take this medication   • Iodinated Diagnostic Agents Rash       Home Medications:    Current Outpatient Medications:   •  levothyroxine (SYNTHROID, LEVOTHROID) 88 MCG tablet, Take 88 mcg by mouth Daily., Disp: , Rfl:     Past Medical History:   Diagnosis Date   • Allergic    • Arthritis        Past Surgical History:   Procedure Laterality Date   • SHOULDER SURGERY         Social History     Occupational History   • Not on file   Tobacco Use   • Smoking status: Never Smoker   • Smokeless tobacco: Never Used   Vaping Use   • Vaping Use: Never used   Substance and Sexual Activity   • Alcohol use: No   • Drug use: No   • Sexual activity: Defer      Social History     Social History Narrative   • Not on file       History reviewed. No pertinent family history.    Review of Systems:      Constitutional: Denies fever, shaking or chills   Eyes: Denies change in visual acuity   HEENT: Denies nasal congestion or sore throat   Respiratory: Denies cough or shortness of breath   Cardiovascular: Denies chest pain or edema  Endocrine: Denies tremors, palpitations, intolerance of heat or cold, polyuria, polydipsia.  GI: Denies abdominal pain, nausea, vomiting, bloody stools or diarrhea  : Denies frequency, urgency, incontinence, retention, or nocturia.  Musculoskeletal: Denies numbness, tingling or loss of motor function except as above  Integument: Denies rash, lesion or ulceration  "  Neurologic: Denies headache or focal weakness, deficits  Heme: Denies spontaneous or excessive bleeding, epistaxis, hematuria, melena, fatigue, enlarged or tender lymph nodes.      All other pertinent positives and negatives as noted above in HPI.    Physical Exam:   56 y.o. female  Vitals:    04/11/22 1516   Temp: 97.8 °F (36.6 °C)   TempSrc: Temporal   Weight: 80.8 kg (178 lb 1.6 oz)   Height: 170.2 cm (67\")     General:  Patient is awake and alert.  Appears in no acute distress or discomfort.    Psych:  Affect and demeanor are appropriate.    Extremities: Left shoulder is examined.  Skin is benign.  Previous portals are well-healed.  Active and passive shoulder motion remains limited.      Imaging: MRI of the left shoulder is reviewed along with the associated report.  She has a large area of full-thickness chondral loss in the central humeral head.  This is fairly sharply demarcated.  She has chronic appearing chondral loss on the glenoid side as well.    Assessment/Plan:  Left shoulder adhesive capsulitis, glenohumeral osteoarthritis    She appears to have significant chondral loss.  I explained that this is unfortunately not something that an arthroscopy is likely to help.  She does have significant persistent adhesive capsulitis and I do think a capsule release is an option for her to help recover her motion.  We talked about it and she is in no hurry to pursue any further surgery at this time.  She elected to try a glenohumeral injection and another trial of physical therapy.  The risk, benefits and alternatives to the injection were discussed.  She consented and the injection was performed as described below.  I have referred her back to PT.  I am going to keep her on work restrictions.  I will see her back in 6 weeks to check her progress.      Large Joint Arthrocentesis: L glenohumeral  Date/Time: 4/11/2022 3:46 PM  Consent given by: patient  Site marked: site marked  Timeout: Immediately prior to " procedure a time out was called to verify the correct patient, procedure, equipment, support staff and site/side marked as required   Supporting Documentation  Indications: pain   Procedure Details  Location: shoulder - L glenohumeral  Preparation: Patient was prepped and draped in the usual sterile fashion  Needle size: 25 G  Approach: anterior  Medications administered: 80 mg methylPREDNISolone acetate 80 MG/ML; 2 mL lidocaine (cardiac)  Patient tolerance: patient tolerated the procedure well with no immediate complications        Artie Sanchez MD    04/11/2022

## 2022-04-12 ENCOUNTER — APPOINTMENT (OUTPATIENT)
Dept: GENERAL RADIOLOGY | Facility: HOSPITAL | Age: 57
End: 2022-04-12

## 2022-04-14 ENCOUNTER — TREATMENT (OUTPATIENT)
Dept: PHYSICAL THERAPY | Facility: CLINIC | Age: 57
End: 2022-04-14

## 2022-04-14 DIAGNOSIS — M75.02 ADHESIVE CAPSULITIS OF LEFT SHOULDER: Primary | ICD-10-CM

## 2022-04-14 PROCEDURE — 97140 MANUAL THERAPY 1/> REGIONS: CPT | Performed by: PHYSICAL THERAPIST

## 2022-04-14 PROCEDURE — 97161 PT EVAL LOW COMPLEX 20 MIN: CPT | Performed by: PHYSICAL THERAPIST

## 2022-04-14 NOTE — PROGRESS NOTES
Physical Therapy Initial Evaluation and Plan of Care    Patient: Roula Willard   : 1965  Diagnosis/ICD-10 Code:  Adhesive capsulitis of left shoulder [M75.02]  Referring practitioner: Artie Sanchez MD  Date of Initial Visit: 2022  Today's Date: 2022  Patient seen for 1 sessions           Subjective Questionnaire: QuickDASH:  63% disability; Work Module: 75% disability      Subjective Evaluation    History of Present Illness  Onset date: ~2 yrs ago - injured L shoulder when roller collapsed at work.  Mechanism of injury: Surgeries: 2020 : L RCR, labrum and bicep repair.  Manipulation L shoulder Decenb2020. Conservative therapy for a few weeks after manipulation - continued on her own. Living with pain since: Dr. Sanchez for second opinion: referred for PT.     Working: light duty until therapy completed    PMH: reviewed in Epic    Subjective comment: 57 y/o female referred to PT with a diagnosis of Adhesive capsulitis L shoulder: states MD recommended capsular release, but patient wanted to try conservative treatment first: had injection at MD office. Previous manipulation did not help: c/o decreased movement (especially when trying to reach above shoulder level); weakness in shoulder and UE; numbness in thumb and lateral wrist.   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   Precautions and Work Restrictions: light dutyPain  Current pain ratin  At best pain ratin  At worst pain rating: 10  Location: L shoulder and into breat area; bicep area; lateral arm  Quality: discomfort, dull ache, radiating and sharp  Relieving factors: rest  Aggravating factors: movement, lifting and overhead activity (idle too long; reaching outward; above chest level for anything.)  Progression: improved (slightly better after injection)    Social Support  Lives in: one-story house  Lives with: roommate.    Hand dominance: right    Diagnostic Tests  MRI  studies: abnormal    Treatments  Previous treatment: physical therapy (after manipulation)  Current treatment: injection treatment  Patient Goals  Patient goals for therapy: independence with ADLs/IADLs  Patient goal: full use of arm again.           Objective          Postural Observations  Seated posture: fair  Standing posture: fair        Observations     Additional Shoulder Observation Details  Holds L arm in protective fashion at times. L scap hiking and forward shoulder.     Tenderness     Additional Tenderness Details  Moderate tenderness over the anterior biceps tendon/capsule and RC lateral arm.    Neurological Testing     Sensation     Shoulder   Left Shoulder   Diminished: light touch    Right Shoulder   Intact: light touch    Comments   Left light touch: L thumb    Active Range of Motion   Left Shoulder   Flexion: 70 degrees with pain  Extension: 35 degrees with pain  Abduction: 45 degrees with pain  External rotation BTH: Active external rotation behind the head: unable.   Internal rotation 0°: 25 degrees   Internal rotation BTB: Active internal rotation behind the back: to lateral hip/upper buttock.     Right Shoulder   Flexion: 155 degrees   Abduction: 150 degrees   External rotation BTH: T4   Internal rotation BTB: T9     Additional Active Range of Motion Details  significant guarding with any movement:hiking of shoulder and poor tolerance to movement.  Elbow, wrist and hand WFL's in all planes    Passive Range of Motion     Additional Passive Range of Motion Details  L shoulder exhibit moderate to max impairment in all planes with significant guarding.    Scapular Mobility   Left Shoulder   Scapular mobility: fair    Right Shoulder   Scapular mobility: WFL    Joint Play   Left Shoulder  Hypomobile in the anterior capsule, posterior capsule and inferior capsule.    Right Shoulder  Joints within functional limits are the anterior capsule, posterior capsule and inferior capsule.     Strength/Myotome  Testing     Additional Strength Details  R UE 5/5 in major planes.  L Shoulder difficult to assess secondary to pain and guarding: grossly ER 3+/5; IR 4/5; Flexion 3+/5; ABD 3+/5     Tests     Additional Tests Details  L Shoulder difficult to assess secondary to pain and guarding          Assessment & Plan     Assessment  Impairments: abnormal muscle firing, abnormal muscle tone, abnormal or restricted ROM, activity intolerance, impaired physical strength, lacks appropriate home exercise program and pain with function  Functional Limitations: carrying objects, lifting, pulling, pushing, uncomfortable because of pain, reaching behind back, reaching overhead and unable to perform repetitive tasks  Assessment details: Patient presents with L shoulder work-related injury with subsequent surgery and manipulation; trail of conservative care with minimal benefit; returning for PT to try and avoid another surgery: exhibits significant pain with use of shoulder; decreased passive and active ROM; weakness and numbness in thumb; currently on light duty - will initiate therapeutic interventions to reduce pain and restore function: has the potential to benefit from therapy and appears motivated.      Prognosis: good    Goals  Plan Goals: SHORT TERM GOALS: Time for goal achievement: 4 weeks  1. Patient to be compliant with their initial HEP.  2. Patient Independent with AAROM shoulder ex. Pulley or cane.  3. Patient has close to full PROM L Shoulder.  4. Patient to exhibit active shoulder flexion/abduction 130 or better to assist with overhead activities without pain.     LONG TERM GOALS: Time for goal achievement: 2 months  1. Pt score < 15% perceived disability on DASH.  2. Pt has functional AROM shoulder flexion/abduction without pain to allow for return to work and allow for ADL's/IADL's.  3. Pt has 5/5 UE strength for chores around the house or work.  4. Pt reports she is ready for DC to [facility] for self management  of her condition.      Plan  Therapy options: will be seen for skilled therapy services  Planned modality interventions: cryotherapy, electrical stimulation/Iranian stimulation, ultrasound and thermotherapy (hydrocollator packs)  Planned therapy interventions: manual therapy, joint mobilization, home exercise program, functional ROM exercises, flexibility, neuromuscular re-education, soft tissue mobilization, strengthening, stretching and therapeutic activities  Frequency: 2x week  Duration in visits: 10  Treatment plan discussed with: patient        History # of Personal Factors and/or Comorbidities: LOW (0)  Examination of Body System(s): # of elements: MODERATE (3)  Clinical Presentation: EVOLVING  Clinical Decision Making: LOW       Timed:         Manual Therapy:    10     mins  37042;     Therapeutic Exercise:         mins  76833;     Neuromuscular Jamar:        mins  10969;    Therapeutic Activity:          mins  31643;     Gait Training:           mins  20537;     Ultrasound:          mins  49330;    Ionto                                  mins   38248  Self Care                            mins   51994  Aquatic                               mins 00467      Un-Timed:  Electrical Stimulation:         mins  13775 (MC );  Dry Needling          mins self-pay  Traction         mins 08890  Low Eval     35     Mins  26241  Mod Eval          Mins  67163  High Eval                            Mins  94829  Re-Eval                               mins  14579        Timed Treatment:  10    mins   Total Treatment:    45    mins    PT SIGNATURE: Valentin Luke PT   [place] License#: [id number]       Electronically signed by Valentin Luke PT, 04/14/22, 12:45 PM EDT      Initial Certification  Certification Period:  4/14/2022 thru 7/12/2022  I certify that the therapy services are furnished while this patient is under my care.  The services outlined above are required by this patient, and will be reviewed every 90 days.       Physician  Signature:__________________________________________________    PHYSICIAN: Artei Sanchez MD      DATE:     Please sign and return via fax to [phone].. Thank you, [facility].

## 2022-04-19 ENCOUNTER — TREATMENT (OUTPATIENT)
Dept: PHYSICAL THERAPY | Facility: CLINIC | Age: 57
End: 2022-04-19

## 2022-04-19 DIAGNOSIS — M75.02 ADHESIVE CAPSULITIS OF LEFT SHOULDER: Primary | ICD-10-CM

## 2022-04-19 PROCEDURE — 97140 MANUAL THERAPY 1/> REGIONS: CPT | Performed by: PHYSICAL THERAPIST

## 2022-04-19 PROCEDURE — 97110 THERAPEUTIC EXERCISES: CPT | Performed by: PHYSICAL THERAPIST

## 2022-04-19 PROCEDURE — 97014 ELECTRIC STIMULATION THERAPY: CPT | Performed by: PHYSICAL THERAPIST

## 2022-04-19 NOTE — PROGRESS NOTES
Physical Therapy Daily Progress Note     Diagnosis Plan   1. Adhesive capsulitis of left shoulder         VISIT#: 2    Subjective   Roula Willard reports: shoulder worse with prolonged working - working long shift; has pulley at home but has not been using.      Objective     See Exercise, Manual, and Modality Logs for complete treatment.     Patient Education: advised to resume AAROM with pulley and cane for HEP as previously prescribed,    Assessment/Plan - significant pain with PROM - catching sensation with pain. Able to limit ROM and decrease pain.       Progress per Plan of Care            Timed:         Manual Therapy:   10     mins  93157;     Therapeutic Exercise:    30     mins  89274;     Neuromuscular Jamar:        mins  05365;    Therapeutic Activity:          mins  61333;     Gait Training:          mins  26226;     Ultrasound:          mins  87179;    Ionto                                   mins   85462  Self Care                            mins   87335  Canalith Repos                   mins  4209  Aquatic                               mins 86664    Un-Timed:  Electrical Stimulation:     15    mins  69162 ( );  Dry Needling          mins self-pay  Traction          mins 48106  Low Eval          Mins  70866  Mod Eval         Mins  89643  High Eval                            Mins  88638  Re-Eval                               mins  36706    Timed Treatment:  40    mins   Total Treatment:     55   mins    Valentin Luke, PT PT, DPT, [id number]

## 2022-04-27 ENCOUNTER — TREATMENT (OUTPATIENT)
Dept: PHYSICAL THERAPY | Facility: CLINIC | Age: 57
End: 2022-04-27

## 2022-04-27 DIAGNOSIS — M75.02 ADHESIVE CAPSULITIS OF LEFT SHOULDER: Primary | ICD-10-CM

## 2022-04-27 PROCEDURE — 97140 MANUAL THERAPY 1/> REGIONS: CPT | Performed by: PHYSICAL THERAPIST

## 2022-04-27 PROCEDURE — 97110 THERAPEUTIC EXERCISES: CPT | Performed by: PHYSICAL THERAPIST

## 2022-04-27 PROCEDURE — 97014 ELECTRIC STIMULATION THERAPY: CPT | Performed by: PHYSICAL THERAPIST

## 2022-04-27 NOTE — PROGRESS NOTES
Physical Therapy Daily Progress Note     Diagnosis Plan   1. Adhesive capsulitis of left shoulder         VISIT#: 3    Subjective   Roula Willard reports: the injection had helped, but seems to be wearing off? More pain with movement and catching sensation.  Has TENS at home, but needs instruction for use - will bring next visit.      Objective     See Exercise, Manual, and Modality Logs for complete treatment.     Patient Education: advised to resume AAROM with pulley and cane for HEP as previously prescribed: reviewed HEP and precautions for progressing ROM.    Assessment/Plan - significant pain with PROM - catching sensation with pain and guarding. Able to limit ROM and decrease pain. Abduction and ER significantly painful actively or passively.      Progress per Plan of Care            Timed:         Manual Therapy:   10     mins  03229;     Therapeutic Exercise:    30     mins  41673;     Neuromuscular Jamar:        mins  08248;    Therapeutic Activity:          mins  09683;     Gait Training:          mins  21863;     Ultrasound:          mins  98772;    Ionto                                   mins   06880  Self Care                            mins   60138  Canalith Repos                   mins  4209  Aquatic                               mins 86865    Un-Timed:  Electrical Stimulation:     15    mins  83832 ( );  Dry Needling          mins self-pay  Traction          mins 27296  Low Eval          Mins  03988  Mod Eval         Mins  70152  High Eval                            Mins  49595  Re-Eval                               mins  40707    Timed Treatment:  40    mins   Total Treatment:     55   mins    Valentin Luke, PT PT, DPT, [id number]

## 2022-04-28 ENCOUNTER — TREATMENT (OUTPATIENT)
Dept: PHYSICAL THERAPY | Facility: CLINIC | Age: 57
End: 2022-04-28

## 2022-04-28 DIAGNOSIS — M75.02 ADHESIVE CAPSULITIS OF LEFT SHOULDER: Primary | ICD-10-CM

## 2022-04-28 PROCEDURE — 97035 APP MDLTY 1+ULTRASOUND EA 15: CPT | Performed by: PHYSICAL THERAPIST

## 2022-04-28 PROCEDURE — 97014 ELECTRIC STIMULATION THERAPY: CPT | Performed by: PHYSICAL THERAPIST

## 2022-04-28 PROCEDURE — 97110 THERAPEUTIC EXERCISES: CPT | Performed by: PHYSICAL THERAPIST

## 2022-04-28 NOTE — PROGRESS NOTES
Physical Therapy Daily Progress Note     Diagnosis Plan   1. Adhesive capsulitis of left shoulder         VISIT#: 4    Subjective   Roula Willard reports: brought home TENS for instruction; shoulder still very sore and catches with certain movements. Very sore in L chest/breast region: cardiac and mammogram (-).      Objective     See Exercise, Manual, and Modality Logs for complete treatment.     Patient Education: instructed in use of TENS for home: placement; precautions; indications and frequency and dosage x 10 min: for home use..     Palpation: very tender anterior aspect L shoulder.    Assessment/Plan - ER less painful in supine than standing; instructed in TENS use to control L shoulder pain; poor tolerance to manual with catching and sharp pain: oscillations more tolerable. Used US anterior aspect of L shoulder - no complications. Good understanding of home TENS use to control pain: had previous TENS unit but did not know how to use it.    Progress per Plan of Care            Timed:         Manual Therapy:   5     mins  50551;     Therapeutic Exercise:    30     mins  14738;     Neuromuscular Jamar:        mins  06639;    Therapeutic Activity:          mins  76528;     Gait Training:          mins  72766;     Ultrasound:     10     mins  79470;    Ionto                                   mins   38939  Self Care                            mins   01787  Canalith Repos                   mins  4209  Aquatic                               mins 37405    Un-Timed:  Electrical Stimulation:    10     mins  03360 ( );  Dry Needling          mins self-pay  Traction          mins 88722  Low Eval          Mins  64128  Mod Eval         Mins  43047  High Eval                            Mins  32066  Re-Eval                               mins  76516    Timed Treatment:  40    mins   Total Treatment:     55   mins    Valentin Luke PT PT, DPT, [id number]

## 2022-05-02 ENCOUNTER — TREATMENT (OUTPATIENT)
Dept: PHYSICAL THERAPY | Facility: CLINIC | Age: 57
End: 2022-05-02

## 2022-05-02 DIAGNOSIS — M75.02 ADHESIVE CAPSULITIS OF LEFT SHOULDER: Primary | ICD-10-CM

## 2022-05-02 PROCEDURE — 97110 THERAPEUTIC EXERCISES: CPT | Performed by: PHYSICAL THERAPIST

## 2022-05-02 PROCEDURE — 97035 APP MDLTY 1+ULTRASOUND EA 15: CPT | Performed by: PHYSICAL THERAPIST

## 2022-05-02 NOTE — PROGRESS NOTES
Physical Therapy Daily Progress Note     Diagnosis Plan   1. Adhesive capsulitis of left shoulder         VISIT#: 5    Subjective   Roula Willard reports: increased pain in shoulder after last visit - feels like injection wearing off. Using TENS at home. Had to call in work over weekend: couldn't put hair in ponytail due to pain.    Objective     See Exercise, Manual, and Modality Logs for complete treatment.     Patient Education:   Palpation: very tender anterior aspect L shoulder.    Assessment/Plan - significant tenderness and not tolerating PROM/mobs secondary to pain and catching sensation. Discussed referring back to MD if no improvement noted on next visit.    Progress per Plan of Care            Timed:         Manual Therapy:   5     mins  34276;     Therapeutic Exercise:    30     mins  20016;     Neuromuscular Jamar:        mins  49174;    Therapeutic Activity:          mins  59647;     Gait Training:          mins  81439;     Ultrasound:     10     mins  40065;    Ionto                                   mins   36475  Self Care                            mins   14264  Canalith Repos                   mins  4209  Aquatic                               mins 84898    Un-Timed:  Electrical Stimulation:         mins  38892 ( );  Dry Needling          mins self-pay  Traction          mins 29117  Low Eval          Mins  22093  Mod Eval         Mins  93930  High Eval                            Mins  99706  Re-Eval                               mins  58948    Timed Treatment:  45    mins   Total Treatment:     45   mins    Valentin Luke, PT PT, DPT, [id number]

## 2022-05-03 ENCOUNTER — TELEPHONE (OUTPATIENT)
Dept: ORTHOPEDIC SURGERY | Facility: CLINIC | Age: 57
End: 2022-05-03

## 2022-05-04 ENCOUNTER — TELEPHONE (OUTPATIENT)
Dept: ORTHOPEDIC SURGERY | Facility: CLINIC | Age: 57
End: 2022-05-04

## 2022-05-04 NOTE — TELEPHONE ENCOUNTER
I left her a message.  I told her I would be happy to call in a prescription for tramadol for her if the ibuprofen is affecting her stomach.  I told her to keep her appointment with me on May 23 so that we can discuss further options.  It sounds like conservative treatment has not been effective for her thus far and we may have to consider surgical options.  Again, we will continue that discussion when she comes in on May 23.  I told her to contact the office if she wants the tramadol and I will be happy to call that in for her.

## 2022-05-09 RX ORDER — TRAMADOL HYDROCHLORIDE 50 MG/1
50 TABLET ORAL EVERY 4 HOURS PRN
Qty: 50 TABLET | Refills: 0 | Status: SHIPPED | OUTPATIENT
Start: 2022-05-09

## 2022-05-09 NOTE — TELEPHONE ENCOUNTER
Roula came by the office and stated she would like the prescription of Tramadol called in to the [facility] on [address] in [place]

## 2022-05-11 ENCOUNTER — TREATMENT (OUTPATIENT)
Dept: PHYSICAL THERAPY | Facility: CLINIC | Age: 57
End: 2022-05-11

## 2022-05-11 DIAGNOSIS — M75.02 ADHESIVE CAPSULITIS OF LEFT SHOULDER: Primary | ICD-10-CM

## 2022-05-11 PROCEDURE — 97140 MANUAL THERAPY 1/> REGIONS: CPT | Performed by: PHYSICAL THERAPIST

## 2022-05-11 PROCEDURE — 97035 APP MDLTY 1+ULTRASOUND EA 15: CPT | Performed by: PHYSICAL THERAPIST

## 2022-05-11 PROCEDURE — 97110 THERAPEUTIC EXERCISES: CPT | Performed by: PHYSICAL THERAPIST

## 2022-05-11 NOTE — PROGRESS NOTES
Physical Therapy Daily Progress Note/Discharge Summary     Diagnosis Plan   1. Adhesive capsulitis of left shoulder         VISIT#: 6    Subjective   Roula Willard ([birth year]) reports: to see MD in 2 weeks. No improvement: shots wearing off and pain returning; ROM limited.    Subjective Questionnaire: QuickDASH:  63% disability; Work Module: 75% disability - No change per patient.    Objective          Observations     Additional Shoulder Observation Details  Holds L arm in protective fashion at times. L scap hiking and forward shoulder.     Tenderness     Additional Tenderness Details  Moderate tenderness over the anterior biceps tendon/capsule and RC lateral arm.    Neurological Testing     Sensation     Shoulder   Left Shoulder   Diminished: light touch    Right Shoulder   Intact: light touch    Comments   Left light touch: L thumb    Active Range of Motion   Left Shoulder   Flexion: 70 degrees with pain  Extension: 35 degrees with pain  Abduction: 45 degrees with pain  External rotation BTH: Active external rotation behind the head: unable.   Internal rotation 0°: 25 degrees   Internal rotation BTB: Active internal rotation behind the back: to lateral hip/upper buttock.     Right Shoulder   Flexion: 155 degrees   Abduction: 150 degrees   External rotation BTH: T4   Internal rotation BTB: T9     Additional Active Range of Motion Details  Significant guarding with any movement:hiking of shoulder and poor tolerance to movement.  Elbow, wrist and hand WFL's in all planes    Passive Range of Motion     Additional Passive Range of Motion Details  L shoulder exhibit moderate to max impairment in all planes with significant guarding and pain.    Scapular Mobility   Left Shoulder   Scapular mobility: fair    Right Shoulder   Scapular mobility: WFL    Strength/Myotome Testing     Additional Strength Details  R UE 5/5 in major planes.  L Shoulder difficult to assess secondary to pain and guarding: grossly ER 3+/5; IR  4/5; Flexion 3+/5; ABD 3+/5     Tests     Additional Tests Details  L Shoulder difficult to assess secondary to pain and guarding        See Exercise, Manual, and Modality Logs for complete treatment.     Patient Education: advised to continue to maintain ROM as able and use modalities to control pain prn        Assessment/Plan - no change in status and extremely poor tolerance to Physical therapy; recommend return to MD for reassessment. Goals not met.    Other - discharge and return to MD            Timed:         Manual Therapy:   10    mins  97504;     Therapeutic Exercise:    20     mins  18238;     Neuromuscular Jamar:        mins  89711;    Therapeutic Activity:          mins  58370;     Gait Training:          mins  86819;     Ultrasound:     10     mins  26988;    Ionto                                   mins   68224  Self Care                            mins   21222  Canalith Repos                   mins  4209  Aquatic                               mins 91146    Un-Timed:  Electrical Stimulation:         mins  57006 ( );  Dry Needling          mins self-pay  Traction          mins 96036  Low Eval          Mins  88759  Mod Eval         Mins  55729  High Eval                            Mins  41688  Re-Eval                               mins  63879    Timed Treatment:  40   mins   Total Treatment:     40   mins    Valentin Luke PT PT, DPT, [id number]

## 2022-05-23 ENCOUNTER — OFFICE VISIT (OUTPATIENT)
Dept: ORTHOPEDIC SURGERY | Facility: CLINIC | Age: 57
End: 2022-05-23

## 2022-05-23 VITALS — TEMPERATURE: 97.5 F | WEIGHT: 178 LBS | BODY MASS INDEX: 27.94 KG/M2 | HEIGHT: 67 IN

## 2022-05-23 DIAGNOSIS — G89.29 CHRONIC LEFT SHOULDER PAIN: Primary | ICD-10-CM

## 2022-05-23 DIAGNOSIS — M25.512 CHRONIC LEFT SHOULDER PAIN: Primary | ICD-10-CM

## 2022-05-23 PROCEDURE — 99214 OFFICE O/P EST MOD 30 MIN: CPT | Performed by: ORTHOPAEDIC SURGERY

## 2022-05-23 NOTE — PROGRESS NOTES
Medical Record Number: [id number]    Chief Complaints: Follow-up regarding left shoulder pain    History of Present Illness:     56 y.o. female patient who presents for follow-up of the left shoulder.  She reports persistent pain and symptoms.  The injection helped only transiently.  She feels like she has not made any significant progress in PT.  She is very frustrated.  She has been through extensive treatment including a previous manipulation and prolonged formal physical therapy in addition to multiple injections.    Allergies:   Allergies   Allergen Reactions   • Latex Unknown - High Severity   • Penicillins Unknown - High Severity     Family allergy. Pt. Don't take this medication   • Iodinated Diagnostic Agents Rash       Home Medications:    Current Outpatient Medications:   •  levothyroxine (SYNTHROID, LEVOTHROID) 88 MCG tablet, Take 88 mcg by mouth Daily., Disp: , Rfl:   •  traMADol (ULTRAM) 50 MG tablet, Take 1 tablet by mouth Every 4 (Four) Hours As Needed for Moderate Pain ., Disp: 50 tablet, Rfl: 0    Past Medical History:   Diagnosis Date   • Allergic    • Arthritis        Past Surgical History:   Procedure Laterality Date   • SHOULDER SURGERY         Social History     Occupational History   • Not on file   Tobacco Use   • Smoking status: Never Smoker   • Smokeless tobacco: Never Used   Vaping Use   • Vaping Use: Never used   Substance and Sexual Activity   • Alcohol use: No   • Drug use: No   • Sexual activity: Defer      Social History     Social History Narrative   • Not on file       History reviewed. No pertinent family history.    Review of Systems:      Constitutional: Denies fever, shaking or chills   Eyes: Denies change in visual acuity   HEENT: Denies nasal congestion or sore throat   Respiratory: Denies cough or shortness of breath   Cardiovascular: Denies chest pain or edema  Endocrine: Denies tremors, palpitations, intolerance of heat or cold, polyuria, polydipsia.  GI: Denies  "abdominal pain, nausea, vomiting, bloody stools or diarrhea  : Denies frequency, urgency, incontinence, retention, or nocturia.  Musculoskeletal: Denies numbness, tingling or loss of motor function except as above  Integument: Denies rash, lesion or ulceration   Neurologic: Denies headache or focal weakness, deficits  Heme: Denies spontaneous or excessive bleeding, epistaxis, hematuria, melena, fatigue, enlarged or tender lymph nodes.      All other pertinent positives and negatives as noted above in HPI.    Physical Exam:   56 y.o. female  Vitals:    05/23/22 1421   Temp: 97.5 °F (36.4 °C)   TempSrc: Temporal   Weight: 80.7 kg (178 lb)   Height: 170.2 cm (67.01\")     General:  Patient is awake and alert.  Appears in no acute distress or discomfort.    Psych:  Affect and demeanor are appropriate.    Cardiovascular:  Regular rate and rhythm.    Lungs:  Good chest expansion.  Breathing unlabored.    Extremities: Left shoulder is examined.  Skin is benign.  Mild to moderate anterior tenderness without effusion.  Her active and passive motion remain limited.  Forward elevation is about 80 degrees or so.  External rotation is about 30-40.  Internal rotation is to between her side and back pocket.  Normal motor and sensory function in the lower arm and hand.  Palpable radial pulse.  Brisk capillary refill.    Imaging: MRI of the right shoulder is reviewed along with the associated report.  I have independently interpreted the images.  She has a large chondral defect in the articular cartilage of the humeral head.  There is also some corresponding chondral damage on the glenoid consistent with at least moderate osteoarthritis.  She has a lot of debris and synovitis in the joint.  She has postsurgical changes as described below.  No significant cuff tear.  The radiology report is listed below.     IMPRESSION:  Postoperative changes from previous debridement of  partial-thickness articular side supraspinatus tendon tear, " biceps  tenodesis, and debridement of the glenoid labrum. There is degenerative  change at the glenohumeral joint with a large acute or subacute chondral  defect at the humeral head centrally. Large intra-articular chondral  fragments are observed    Assessment/Plan:  Left shoulder osteoarthritis, adhesive capsulitis, bicep tendonitis    She has severe motion loss, much more than what I would expect for the degree of arthritis seen on her MRI.  Ultimately, she is headed towards an arthroplasty.  We discussed this in detail.  She wants to put that off as long as possible which I certainly understand.  Unfortunately at this point conservative treatment seems to have failed her.  We discussed further options for her.  I do consider she is a candidate for an arthroscopic debridement but I was careful to outline for her the limited goals of that procedure.  My hope is that we could achieve some improvement in her pain and I would expect significant improvement in her overall motion.  Her motion is probably not going to be full but I would expect we should be able to get her back to at least functional motion.  She needs to understand though that this is not going to result in a normal shoulder for her.  I think the best case scenario still results in her having some degree of pain and motion loss.  Also, she is still going to need the arthroplasty at some point.  We talked it over and she wants to move forward with the scope debridement.    We had a thorough discussion regarding the risks, benefits and alternatives to an arthroscopic synovectomy and debridement.  I explained that surgical risks include infection, hematoma, persistent pain and/or loss of motion, iatrogenic nerve and/or blood vessel injury resulting in permanent weakness, numbness or dysfunction, RSD, DVT, PE, positioning related neuropraxia, and anesthesia related complications resulting in death.  We further discussed the possible need to address any  rotator cuff, labral and/or biceps pathology, if found at the time of the surgery.  I explained to her that I would plan to address this in the same surgical setting if we were to identify any unexpected pathology.  We discussed the risk associated with this including possible anchor related complications including failure of fixation, loosening, cutout of the anchors, chondrolysis, re-tear necessitating revision surgery.  She voiced understanding of the risks, benefits, and alternative forms of treatment that were discussed and consents to proceed.  No guarantees were given regarding the results of this procedure.  I did answer all the questions posed by the patient in detail.    Artie Sanchez MD    05/23/2022

## 2022-05-24 RX ORDER — CEFAZOLIN SODIUM 2 G/100ML
2 INJECTION, SOLUTION INTRAVENOUS ONCE
Status: CANCELLED | OUTPATIENT
Start: 2022-07-19 | End: 2022-05-24

## 2022-06-07 ENCOUNTER — TELEPHONE (OUTPATIENT)
Dept: ORTHOPEDIC SURGERY | Facility: CLINIC | Age: 57
End: 2022-06-07

## 2022-06-07 NOTE — TELEPHONE ENCOUNTER
Provider: PATIENT     Caller: PATIENT    Relationship to Patient: SELF    Phone Number: [phone]    Reason for Call:  PT LAST SEEN WITH DR. CHRISTIAN ON 05/23/22.   PT. STATES THAT SHE GAVE HER WORK A COPY- BUT SHE CANNOT FIND HER COPY.   SHE NEEDS AN UPDATED WORK RESTRICTIONS LETTER TO BE SENT TO HER EMAIL-     [email or web address]      ALSO, STATES THAT HER  NEEDS SOMETHING THAT SAYS THAT THIS IS AN OLD WORK COMP INJURY, AND SHE IS WAITING FOR APPROVAL FROM WORK COMP.     PLEASE CALL PT. TO ADVISE.

## 2022-06-09 NOTE — TELEPHONE ENCOUNTER
Work note sent to patient per her request and left on her voicemail if  needs information to let them know to send a formal request to the office for review.

## 2022-06-13 ENCOUNTER — TELEPHONE (OUTPATIENT)
Dept: ORTHOPEDIC SURGERY | Facility: CLINIC | Age: 57
End: 2022-06-13

## 2022-06-13 NOTE — TELEPHONE ENCOUNTER
Caller: SHLOMO GUARDADO     Relationship:  FOR PATIENT    Best call back number: [phone]    What form or medical record are you requesting: OFFICE NOTES FROM 04/11/22 AND 05/23/22    Who is requesting this form or medical record from you:     How would you like to receive the form or medical records (pick-up, mail, fax): FAX    If fax, what is the fax number: [phone]    Timeframe paperwork needed: ASAP    Additional notes: MR. GUARDADO IS REPRESENTING THE PATIENT FOR HER W/C CASE. PLEASE CALL HIM WITH ANY QUESTIONS.

## 2022-06-21 PROBLEM — M25.512 CHRONIC LEFT SHOULDER PAIN: Status: ACTIVE | Noted: 2022-06-21

## 2022-06-21 PROBLEM — G89.29 CHRONIC LEFT SHOULDER PAIN: Status: ACTIVE | Noted: 2022-06-21

## 2022-07-11 ENCOUNTER — APPOINTMENT (OUTPATIENT)
Dept: PREADMISSION TESTING | Facility: HOSPITAL | Age: 57
End: 2022-07-11

## 2022-07-16 ENCOUNTER — APPOINTMENT (OUTPATIENT)
Dept: LAB | Facility: HOSPITAL | Age: 57
End: 2022-07-16

## 2022-09-06 ENCOUNTER — TELEPHONE (OUTPATIENT)
Dept: FAMILY MEDICINE CLINIC | Facility: CLINIC | Age: 57
End: 2022-09-06

## 2022-09-06 NOTE — TELEPHONE ENCOUNTER
I am willing to take her on as a new patient, but this appointment will have to be scheduled down the road due to busy schedule and I need to be able to accommodate my current patients.  If she needs immediate attention she needs to go to the urgent care or contact her previous physician to address the concerns she has now.  Thank you.

## 2022-09-06 NOTE — TELEPHONE ENCOUNTER
Caller: LUIS CASTRO   1934    Relationship to patient: Other    Best call back number: [phone]    Chief complaint: PAIN BETWEEN SHOULDER BLADES/AND LEFT BREAST AND RT ARM    Type of visit: NEW PATIENT    Requested date: ASAP    If rescheduling, when is the original appointment: N/A    Additional notes:HER PARTNER (LUIS CASTRO) IS REQUESTING DR. ORTEGA TAKE HER AS A NEW PATIENT.

## 2022-09-14 ENCOUNTER — OFFICE VISIT (OUTPATIENT)
Dept: ORTHOPEDIC SURGERY | Facility: CLINIC | Age: 57
End: 2022-09-14

## 2022-09-14 VITALS — WEIGHT: 193.8 LBS | TEMPERATURE: 97.3 F | BODY MASS INDEX: 30.42 KG/M2 | HEIGHT: 67 IN

## 2022-09-14 DIAGNOSIS — G89.29 CHRONIC LEFT SHOULDER PAIN: Primary | ICD-10-CM

## 2022-09-14 DIAGNOSIS — M25.512 CHRONIC LEFT SHOULDER PAIN: Primary | ICD-10-CM

## 2022-09-14 PROCEDURE — 20610 DRAIN/INJ JOINT/BURSA W/O US: CPT | Performed by: ORTHOPAEDIC SURGERY

## 2022-09-14 PROCEDURE — 99213 OFFICE O/P EST LOW 20 MIN: CPT | Performed by: ORTHOPAEDIC SURGERY

## 2022-09-14 RX ORDER — METHYLPREDNISOLONE ACETATE 40 MG/ML
80 INJECTION, SUSPENSION INTRA-ARTICULAR; INTRALESIONAL; INTRAMUSCULAR; SOFT TISSUE
Status: COMPLETED | OUTPATIENT
Start: 2022-09-14 | End: 2022-09-14

## 2022-09-14 RX ADMIN — METHYLPREDNISOLONE ACETATE 80 MG: 40 INJECTION, SUSPENSION INTRA-ARTICULAR; INTRALESIONAL; INTRAMUSCULAR; SOFT TISSUE at 09:41

## 2022-09-14 NOTE — PROGRESS NOTES
CC:  Worsening left shoulder pain    Ms. Liz Willard is seen today in follow-up for her left shoulder.  She continues to have significant pain and dysfunction.  We had her scheduled for surgery but she decided to cancel.  She is just not ready to have another surgery yet.  She does not feel like she could go back to work.  She recently saw another physician who was of the opinion that her shoulder problems were not related to her original work injury.  She insists that all of her symptoms started after the work incident.  Denies any new complaints or issues.    Left shoulder is examined.  Previous portals are healed.  Moderate anterior tenderness without effusion.  Her passive motion is good but active motion is a struggle.  It is difficult to gauge her strength because of pain and guarding.  Attempts at all provocative testing maneuvers are very uncomfortable for her.    Assessment: Left shoulder osteoarthritis with intra-articular loose bodies, synovitis    Plan: We again discussed her options.  An arthroscopic debridement is an option and may buy her some time but ultimately, she is going to require an arthroplasty.  She would still like to continue conservative treatment for now.  The risk, benefits and alternatives to repeat injection were discussed.  She consented and injection was performed as described below.  She will follow-up as needed.    Large Joint Arthrocentesis: L glenohumeral  Date/Time: 9/14/2022 9:41 AM  Consent given by: patient  Site marked: site marked  Timeout: Immediately prior to procedure a time out was called to verify the correct patient, procedure, equipment, support staff and site/side marked as required   Supporting Documentation  Indications: pain   Procedure Details  Location: shoulder - L glenohumeral  Preparation: Patient was prepped and draped in the usual sterile fashion  Needle gauge: 21G.  Approach: anterior  Medications administered: 2 mL lidocaine (cardiac); 80 mg  methylPREDNISolone acetate 40 MG/ML  Patient tolerance: patient tolerated the procedure well with no immediate complications          Artie Sanchez MD

## 2022-11-14 ENCOUNTER — OFFICE VISIT (OUTPATIENT)
Dept: ORTHOPEDIC SURGERY | Facility: CLINIC | Age: 57
End: 2022-11-14

## 2022-11-14 VITALS — HEIGHT: 67 IN | BODY MASS INDEX: 27.95 KG/M2 | WEIGHT: 178.1 LBS | TEMPERATURE: 97.5 F

## 2022-11-14 DIAGNOSIS — G89.29 CHRONIC LEFT SHOULDER PAIN: Primary | ICD-10-CM

## 2022-11-14 DIAGNOSIS — M19.019 ARTHRITIS OF SHOULDER: ICD-10-CM

## 2022-11-14 DIAGNOSIS — M25.512 CHRONIC LEFT SHOULDER PAIN: Primary | ICD-10-CM

## 2022-11-14 PROCEDURE — 99212 OFFICE O/P EST SF 10 MIN: CPT | Performed by: ORTHOPAEDIC SURGERY

## 2022-11-14 RX ORDER — IBUPROFEN 800 MG/1
800 TABLET ORAL 3 TIMES DAILY
Qty: 90 TABLET | Refills: 0 | Status: SHIPPED | OUTPATIENT
Start: 2022-11-14 | End: 2022-12-23

## 2022-11-14 RX ORDER — IBUPROFEN 200 MG
200 TABLET ORAL EVERY 6 HOURS PRN
COMMUNITY
End: 2022-12-23

## 2022-11-14 NOTE — PROGRESS NOTES
CC:  Left shoulder pain    Ms. Liz Willard follows up for her left shoulder.  She says the last injection did not help nearly as much and she wants to discuss further options.  We had a long discussion about continued conservative care versus the pros and cons of an arthroscopic debridement versus arthroplasty.  She is potentially leaning towards the scope debridement.  We did talk about the likely relative short-term benefits of that procedure and the fact that she will almost certainly continue to have some degree of pain afterwards.  She has a hearing coming up in early December.  That will determine whether or not this is approved by work comp.  She is going to wait until that hearing and then reach back out to me to let me know how she wants to proceed.  In the meantime, she requested a prescription strength anti-inflammatory.  She denies any history of GI problems, kidney dysfunction or allergy to NSAIDs.  As per her request, I gave her a prescription for ibuprofen to take as needed.  Risk were discussed.    Artie Sanchez MD

## 2022-12-16 ENCOUNTER — TELEPHONE (OUTPATIENT)
Dept: ORTHOPEDIC SURGERY | Facility: CLINIC | Age: 57
End: 2022-12-16

## 2022-12-16 NOTE — TELEPHONE ENCOUNTER
Caller: COLLIN KRISTAJOHAN    Relationship: SELF    Best call back number: [phone]    What form or medical record are you requesting: OFFICE NOTES -- ESPECIALLY NOTES FROM WHEN SHE HAD INJECTIONS    Who is requesting this form or medical record from you: [facility]    How would you like to receive the form or medical records (pick-up, mail, fax): FAX  If fax, what is the fax number: [phone]    Timeframe paperwork needed: ASAP    Additional notes: ATTN: JANIE OWUSU; NEEDS TO HAVE PT'S INSURANCE ID# ON COVER SHEET ([id number]); PT WOULD LIKE A CALL BACK TO GET INFORMATION ON HOW LONG THIS WILL TAKE TO FACILITATE

## 2022-12-23 ENCOUNTER — APPOINTMENT (OUTPATIENT)
Dept: GENERAL RADIOLOGY | Facility: HOSPITAL | Age: 57
End: 2022-12-23

## 2022-12-23 ENCOUNTER — HOSPITAL ENCOUNTER (EMERGENCY)
Facility: HOSPITAL | Age: 57
Discharge: HOME | End: 2022-12-24
Attending: EMERGENCY MEDICINE | Admitting: EMERGENCY MEDICINE

## 2022-12-23 DIAGNOSIS — M79.671 RIGHT FOOT PAIN: Primary | ICD-10-CM

## 2022-12-23 DIAGNOSIS — S92.354A CLOSED NONDISPLACED FRACTURE OF FIFTH METATARSAL BONE OF RIGHT FOOT, INITIAL ENCOUNTER: ICD-10-CM

## 2022-12-23 PROCEDURE — 99284 EMERGENCY DEPT VISIT MOD MDM: CPT

## 2022-12-23 PROCEDURE — 73630 X-RAY EXAM OF FOOT: CPT

## 2022-12-23 RX ORDER — IBUPROFEN 600 MG/1
600 TABLET ORAL ONCE
Status: COMPLETED | OUTPATIENT
Start: 2022-12-23 | End: 2022-12-23

## 2022-12-23 RX ORDER — ACETAMINOPHEN 500 MG
1000 TABLET ORAL ONCE
Status: COMPLETED | OUTPATIENT
Start: 2022-12-23 | End: 2022-12-24

## 2022-12-23 RX ADMIN — IBUPROFEN 600 MG: 600 TABLET, FILM COATED ORAL at 23:37

## 2022-12-24 VITALS
BODY MASS INDEX: 29.03 KG/M2 | WEIGHT: 185 LBS | HEIGHT: 67 IN | HEART RATE: 82 BPM | TEMPERATURE: 98.6 F | DIASTOLIC BLOOD PRESSURE: 88 MMHG | SYSTOLIC BLOOD PRESSURE: 138 MMHG | OXYGEN SATURATION: 99 % | RESPIRATION RATE: 18 BRPM

## 2022-12-24 RX ORDER — ACETAMINOPHEN 500 MG
1000 TABLET ORAL ONCE
Status: COMPLETED | OUTPATIENT
Start: 2022-12-24 | End: 2022-12-24

## 2022-12-24 RX ADMIN — ACETAMINOPHEN 1000 MG: 500 TABLET ORAL at 00:28

## 2022-12-24 RX ADMIN — ACETAMINOPHEN 1000 MG: 500 TABLET ORAL at 00:35

## 2022-12-24 NOTE — DISCHARGE INSTRUCTIONS
Use cam walker and crutches until seen by the orthopedic doctor    Use Tylenol and Motrin as needed for pain do not take more than 4 doses of Tylenol in 24 hours    Follow-up with one of the orthopedic doctors listed above and return to the ER for worsening symptoms

## 2022-12-24 NOTE — ED PROVIDER NOTES
Subjective   History of Present Illness  Patient is a 57-year-old female who comes in with lateral right foot pain she states she was running to her car and slipped and hit the curb and twisted her right foot and heard a pop and she has pain on the lateral aspect.  She states it is worse with ambulation she rates her pain a 7/10.  She denies any numbness or tingling she denies falling or any other injuries.  She states the pain is constant and radiates into her ankle but the ankle itself does not hurt        Review of Systems   Constitutional: Negative for chills, fatigue and fever.   HENT: Negative for congestion, tinnitus and trouble swallowing.    Eyes: Negative for photophobia, discharge and redness.   Respiratory: Negative for cough and shortness of breath.    Cardiovascular: Negative for chest pain and palpitations.   Gastrointestinal: Negative for abdominal pain, diarrhea, nausea and vomiting.   Genitourinary: Negative for dysuria, frequency and urgency.   Musculoskeletal: Negative for back pain, joint swelling and myalgias.        Lateral right foot pain   Skin: Negative for rash.   Neurological: Negative for dizziness and headaches.   Psychiatric/Behavioral: Negative for confusion.   All other systems reviewed and are negative.      Past Medical History:   Diagnosis Date   • Allergic    • Arthritis        Allergies   Allergen Reactions   • Latex Unknown - High Severity   • Penicillins Unknown - High Severity     Family allergy. Pt. Don't take this medication   • Iodinated Diagnostic Agents Rash       Past Surgical History:   Procedure Laterality Date   • SHOULDER SURGERY         No family history on file.    Social History     Socioeconomic History   • Marital status: Single   Tobacco Use   • Smoking status: Never   • Smokeless tobacco: Never   Vaping Use   • Vaping Use: Never used   Substance and Sexual Activity   • Alcohol use: No   • Drug use: No   • Sexual activity: Defer           Objective   Physical  "Exam  Vitals reviewed.   Constitutional:       General: She is not in acute distress.     Appearance: Normal appearance. She is well-developed. She is not ill-appearing, toxic-appearing or diaphoretic.   HENT:      Head: Normocephalic and atraumatic.      Right Ear: External ear normal.      Left Ear: External ear normal.      Nose: Nose normal.      Mouth/Throat:      Mouth: Mucous membranes are moist.   Eyes:      Conjunctiva/sclera: Conjunctivae normal.      Pupils: Pupils are equal, round, and reactive to light.   Cardiovascular:      Rate and Rhythm: Normal rate and regular rhythm.      Heart sounds: Normal heart sounds.   Pulmonary:      Effort: Pulmonary effort is normal. No respiratory distress.      Breath sounds: Normal breath sounds. No wheezing.   Musculoskeletal:         General: No deformity. Normal range of motion.      Cervical back: Normal range of motion and neck supple.   Skin:     General: Skin is warm and dry.      Capillary Refill: Capillary refill takes less than 2 seconds.   Neurological:      Mental Status: She is alert and oriented to person, place, and time.      GCS: GCS eye subscore is 4. GCS verbal subscore is 5. GCS motor subscore is 6.      Cranial Nerves: No cranial nerve deficit.      Sensory: No sensory deficit.      Deep Tendon Reflexes: Reflexes normal.   Psychiatric:         Mood and Affect: Mood normal.         Behavior: Behavior normal.         Procedures         Patient was placed in a tall cam walker she was distally neurovascularly intact after placement.  She was given crutches and crutch training by the nursing staff.  ED Course      /75   Pulse 89   Temp 98.4 °F (36.9 °C)   Resp 18   Ht 170.2 cm (67\")   Wt 83.9 kg (185 lb)   SpO2 98%   BMI 28.98 kg/m²   Labs Reviewed - No data to display  Medications   acetaminophen (TYLENOL) tablet 1,000 mg (has no administration in time range)   ibuprofen (ADVIL,MOTRIN) tablet 600 mg (600 mg Oral Given 12/23/22 5485) "                                          MDM  Number of Diagnoses or Management Options  Closed nondisplaced fracture of fifth metatarsal bone of right foot, initial encounter  Right foot pain  Diagnosis management comments: Patient had above exam and x-rays were obtained and reviewed and discussed with the patient the patient was found to have a right proximal fifth metatarsal fracture she was placed in a cam walker she was distally neurovascular intact after placement she was given crutches and crutch training by the nursing staff.  She was given Tylenol Motrin for discomfort she fully discharged home to follow-up with the orthopedic doctor and to return if worse she verbalized understood discharge instruction    Risk of Complications, Morbidity, and/or Mortality  Presenting problems: high  Diagnostic procedures: high  Management options: high    Patient Progress  Patient progress: improved      Final diagnoses:   Right foot pain   Closed nondisplaced fracture of fifth metatarsal bone of right foot, initial encounter       ED Disposition  ED Disposition     ED Disposition   Discharge    Condition   Stable    Comment   --             Jeet Price MD  [address]  [place] [address]  [phone]    In 3 days  If symptoms worsen, As needed    ILDA Plunkett DPM  [address] [facility] [address]  [place] [address]  [phone]    In 3 days  If symptoms worsen, As needed    Bj Arriola II, MD  [address] [facility] [address]  [place] [address]  [phone]    In 5 days  If symptoms worsen, As needed         Medication List      Stop    ibuprofen 200 MG tablet  Commonly known as: ADVIL,MOTRIN     ibuprofen 800 MG tablet  Commonly known as: ADVIL,MOTRIN             Anabela Mcgregor, APRN  12/23/22 2473

## 2023-03-30 ENCOUNTER — PREP FOR SURGERY (OUTPATIENT)
Dept: OTHER | Facility: HOSPITAL | Age: 58
End: 2023-03-30
Payer: COMMERCIAL

## 2023-03-30 DIAGNOSIS — G89.29 CHRONIC LEFT SHOULDER PAIN: Primary | ICD-10-CM

## 2023-03-30 DIAGNOSIS — M25.512 CHRONIC LEFT SHOULDER PAIN: Primary | ICD-10-CM

## 2023-03-30 RX ORDER — CEFAZOLIN SODIUM 2 G/100ML
2 INJECTION, SOLUTION INTRAVENOUS ONCE
OUTPATIENT
Start: 2023-03-30 | End: 2023-03-30

## 2023-05-02 ENCOUNTER — PRE-ADMISSION TESTING (OUTPATIENT)
Dept: PREADMISSION TESTING | Facility: HOSPITAL | Age: 58
End: 2023-05-02
Payer: COMMERCIAL

## 2023-05-02 VITALS
WEIGHT: 173.7 LBS | TEMPERATURE: 98.2 F | HEIGHT: 67 IN | OXYGEN SATURATION: 99 % | HEART RATE: 68 BPM | DIASTOLIC BLOOD PRESSURE: 73 MMHG | BODY MASS INDEX: 27.26 KG/M2 | RESPIRATION RATE: 16 BRPM | SYSTOLIC BLOOD PRESSURE: 112 MMHG

## 2023-05-02 LAB
ANION GAP SERPL CALCULATED.3IONS-SCNC: 7.6 MMOL/L (ref 5–15)
BUN SERPL-MCNC: 12 MG/DL (ref 6–20)
BUN/CREAT SERPL: 11.1 (ref 7–25)
CALCIUM SPEC-SCNC: 10.1 MG/DL (ref 8.6–10.5)
CHLORIDE SERPL-SCNC: 108 MMOL/L (ref 98–107)
CO2 SERPL-SCNC: 27.4 MMOL/L (ref 22–29)
CREAT SERPL-MCNC: 1.08 MG/DL (ref 0.57–1)
DEPRECATED RDW RBC AUTO: 41.7 FL (ref 37–54)
EGFRCR SERPLBLD CKD-EPI 2021: 59.7 ML/MIN/1.73
ERYTHROCYTE [DISTWIDTH] IN BLOOD BY AUTOMATED COUNT: 12.6 % (ref 12.3–15.4)
GLUCOSE SERPL-MCNC: 100 MG/DL (ref 65–99)
HCT VFR BLD AUTO: 41.6 % (ref 34–46.6)
HGB BLD-MCNC: 14.2 G/DL (ref 12–15.9)
MCH RBC QN AUTO: 31.4 PG (ref 26.6–33)
MCHC RBC AUTO-ENTMCNC: 34.1 G/DL (ref 31.5–35.7)
MCV RBC AUTO: 92 FL (ref 79–97)
PLATELET # BLD AUTO: 242 10*3/MM3 (ref 140–450)
PMV BLD AUTO: 10.3 FL (ref 6–12)
POTASSIUM SERPL-SCNC: 4.4 MMOL/L (ref 3.5–5.2)
RBC # BLD AUTO: 4.52 10*6/MM3 (ref 3.77–5.28)
SODIUM SERPL-SCNC: 143 MMOL/L (ref 136–145)
WBC NRBC COR # BLD: 5.87 10*3/MM3 (ref 3.4–10.8)

## 2023-05-02 PROCEDURE — 36415 COLL VENOUS BLD VENIPUNCTURE: CPT

## 2023-05-02 PROCEDURE — 80048 BASIC METABOLIC PNL TOTAL CA: CPT

## 2023-05-02 PROCEDURE — 85027 COMPLETE CBC AUTOMATED: CPT

## 2023-05-02 RX ORDER — SIMVASTATIN 20 MG
20 TABLET ORAL NIGHTLY
COMMUNITY
Start: 2023-04-28

## 2023-05-02 NOTE — DISCHARGE INSTRUCTIONS
Take the following medications the morning of surgery:  LEVOTHYROXINE    If you are on prescription narcotic pain medication to control your pain you may also take that medication the morning of surgery.    General Instructions:  Do not eat solid food after midnight the night before surgery.  You may drink clear liquids day of surgery but must stop at least one hour before your hospital arrival time.  It is beneficial for you to have a clear drink that contains carbohydrates the day of surgery.  We suggest a 12 to 20 ounce bottle of Gatorade or Powerade for non-diabetic patients or a 12 to 20 ounce bottle of G2 or Powerade Zero for diabetic patients. (Pediatric patients, are not advised to drink a 12 to 20 ounce carbohydrate drink)    Clear liquids are liquids you can see through.  Nothing red in color.     Plain water                               Sports drinks  Sodas                                   Gelatin (Jell-O)  Fruit juices without pulp such as white grape juice and apple juice  Popsicles that contain no fruit or yogurt  Tea or coffee (no cream or milk added)  Gatorade / Powerade  G2 / Powerade Zero    Infants may have breast milk up to four hours before surgery.  Infants drinking formula may drink formula up to six hours before surgery.   Patients who avoid smoking, chewing tobacco and alcohol for 4 weeks prior to surgery have a reduced risk of post-operative complications.  Quit smoking as many days before surgery as you can.  Do not smoke, use chewing tobacco or drink alcohol the day of surgery.   If applicable bring your C-PAP/ BI-PAP machine in with you to preop day of surgery.  Bring any papers given to you in the doctor’s office.  Wear clean comfortable clothes.  Do not wear contact lenses, false eyelashes or make-up.  Bring a case for your glasses.   Bring crutches or walker if applicable.  Remove all piercings.  Leave jewelry and any other valuables at home.  Hair extensions with metal clips must  be removed prior to surgery.  The Pre-Admission Testing nurse will instruct you to bring medications if unable to obtain an accurate list in Pre-Admission Testing.        If you were given a blood bank ID arm band remember to bring it with you the day of surgery.    Preventing a Surgical Site Infection:  For 2 to 3 days before surgery, avoid shaving with a razor because the razor can irritate skin and make it easier to develop an infection.    Any areas of open skin can increase the risk of a post-operative wound infection by allowing bacteria to enter and travel throughout the body.  Notify your surgeon if you have any skin wounds / rashes even if it is not near the expected surgical site.  The area will need assessed to determine if surgery should be delayed until it is healed.  The night prior to surgery shower using a fresh bar of anti-bacterial soap (such as Dial) and clean washcloth.  Sleep in a clean bed with clean clothing.  Do not allow pets to sleep with you.  Shower on the morning of surgery using a fresh bar of anti-bacterial soap (such as Dial) and clean washcloth.  Dry with a clean towel and dress in clean clothing.  Ask your surgeon if you will be receiving antibiotics prior to surgery.  Make sure you, your family, and all healthcare providers clean their hands with soap and water or an alcohol based hand  before caring for you or your wound.    Day of surgery: 5/16/2023 PT WILL BE NOTIFIED OF ARRIVAL TIME  Your arrival time is approximately two hours before your scheduled surgery time.  Upon arrival, a Pre-op nurse and Anesthesiologist will review your health history, obtain vital signs, and answer questions you may have.  The only belongings needed at this time will be a list of your home medications and if applicable your C-PAP/BI-PAP machine.  A Pre-op nurse will start an IV and you may receive medication in preparation for surgery, including something to help you relax.     Please be  aware that surgery does come with discomfort.  We want to make every effort to control your discomfort so please discuss any uncontrolled symptoms with your nurse.   Your doctor will most likely have prescribed pain medications.      If you are going home after surgery you will receive individualized written care instructions before being discharged.  A responsible adult must drive you to and from the hospital on the day of your surgery and stay with you for 24 hours.  Discharge prescriptions can be filled by the hospital pharmacy during regular pharmacy hours.  If you are having surgery late in the day/evening your prescription may be e-prescribed to your pharmacy.  Please verify your pharmacy hours or chose a 24 hour pharmacy to avoid not having access to your prescription because your pharmacy has closed for the day.    If you are staying overnight following surgery, you will be transported to your hospital room following the recovery period.  [facility] has all private rooms.    If you have any questions please call Pre-Admission Testing at [phone].  Deductibles and co-payments are collected on the day of service. Please be prepared to pay the required co-pay, deductible or deposit on the day of service as defined by your plan.    Call your surgeon immediately if you experience any of the following symptoms:  Sore Throat  Shortness of Breath or difficulty breathing  Cough  Chills  Body soreness or muscle pain  Headache  Fever  New loss of taste or smell  Do not arrive for your surgery ill.  Your procedure will need to be rescheduled to another time.  You will need to call your physician before the day of surgery to avoid any unnecessary exposure to hospital staff as well as other patients.

## 2023-05-16 ENCOUNTER — ANESTHESIA EVENT (OUTPATIENT)
Dept: PERIOP | Facility: HOSPITAL | Age: 58
End: 2023-05-16
Payer: OTHER MISCELLANEOUS

## 2023-05-16 ENCOUNTER — HOSPITAL ENCOUNTER (OUTPATIENT)
Facility: HOSPITAL | Age: 58
Setting detail: HOSPITAL OUTPATIENT SURGERY
Discharge: HOME | End: 2023-05-16
Attending: ORTHOPAEDIC SURGERY | Admitting: ORTHOPAEDIC SURGERY
Payer: OTHER MISCELLANEOUS

## 2023-05-16 ENCOUNTER — ANESTHESIA (OUTPATIENT)
Dept: PERIOP | Facility: HOSPITAL | Age: 58
End: 2023-05-16
Payer: OTHER MISCELLANEOUS

## 2023-05-16 VITALS
TEMPERATURE: 97.5 F | RESPIRATION RATE: 16 BRPM | SYSTOLIC BLOOD PRESSURE: 111 MMHG | DIASTOLIC BLOOD PRESSURE: 69 MMHG | OXYGEN SATURATION: 95 % | HEART RATE: 65 BPM

## 2023-05-16 DIAGNOSIS — G89.29 CHRONIC LEFT SHOULDER PAIN: ICD-10-CM

## 2023-05-16 DIAGNOSIS — M25.512 CHRONIC LEFT SHOULDER PAIN: ICD-10-CM

## 2023-05-16 PROCEDURE — 29823 SHO ARTHRS SRG XTNSV DBRDMT: CPT | Performed by: ORTHOPAEDIC SURGERY

## 2023-05-16 PROCEDURE — 25010000002 KETOROLAC TROMETHAMINE PER 15 MG

## 2023-05-16 PROCEDURE — 25010000002 ONDANSETRON PER 1 MG

## 2023-05-16 PROCEDURE — 25010000002 FENTANYL CITRATE (PF) 50 MCG/ML SOLUTION: Performed by: ANESTHESIOLOGY

## 2023-05-16 PROCEDURE — 25010000002 EPINEPHRINE PER 0.1 MG: Performed by: ORTHOPAEDIC SURGERY

## 2023-05-16 PROCEDURE — 25010000002 DIPHENHYDRAMINE PER 50 MG

## 2023-05-16 PROCEDURE — 0 BUPIVACAINE LIPOSOME 1.3 % SUSPENSION: Performed by: ANESTHESIOLOGY

## 2023-05-16 PROCEDURE — 25010000002 PROPOFOL 10 MG/ML EMULSION

## 2023-05-16 PROCEDURE — C9290 INJ, BUPIVACAINE LIPOSOME: HCPCS | Performed by: ANESTHESIOLOGY

## 2023-05-16 PROCEDURE — 25010000002 SUGAMMADEX 200 MG/2ML SOLUTION

## 2023-05-16 PROCEDURE — 25010000002 CEFAZOLIN IN DEXTROSE 2-4 GM/100ML-% SOLUTION: Performed by: ORTHOPAEDIC SURGERY

## 2023-05-16 PROCEDURE — 25010000002 FENTANYL CITRATE (PF) 100 MCG/2ML SOLUTION

## 2023-05-16 PROCEDURE — 25010000002 MIDAZOLAM PER 1 MG

## 2023-05-16 PROCEDURE — 25010000002 DEXAMETHASONE SODIUM PHOSPHATE 20 MG/5ML SOLUTION

## 2023-05-16 PROCEDURE — 25010000002 MIDAZOLAM PER 1 MG: Performed by: ANESTHESIOLOGY

## 2023-05-16 RX ORDER — SCOLOPAMINE TRANSDERMAL SYSTEM 1 MG/1
1 PATCH, EXTENDED RELEASE TRANSDERMAL
Status: DISCONTINUED | OUTPATIENT
Start: 2023-05-16 | End: 2023-05-16 | Stop reason: HOSPADM

## 2023-05-16 RX ORDER — ONDANSETRON 2 MG/ML
INJECTION INTRAMUSCULAR; INTRAVENOUS AS NEEDED
Status: DISCONTINUED | OUTPATIENT
Start: 2023-05-16 | End: 2023-05-16 | Stop reason: SURG

## 2023-05-16 RX ORDER — ONDANSETRON 2 MG/ML
4 INJECTION INTRAMUSCULAR; INTRAVENOUS ONCE AS NEEDED
Status: DISCONTINUED | OUTPATIENT
Start: 2023-05-16 | End: 2023-05-16 | Stop reason: HOSPADM

## 2023-05-16 RX ORDER — PROMETHAZINE HYDROCHLORIDE 25 MG/1
25 SUPPOSITORY RECTAL ONCE AS NEEDED
Status: DISCONTINUED | OUTPATIENT
Start: 2023-05-16 | End: 2023-05-16 | Stop reason: HOSPADM

## 2023-05-16 RX ORDER — HYDROCODONE BITARTRATE AND ACETAMINOPHEN 7.5; 325 MG/1; MG/1
1 TABLET ORAL ONCE AS NEEDED
Status: DISCONTINUED | OUTPATIENT
Start: 2023-05-16 | End: 2023-05-16 | Stop reason: HOSPADM

## 2023-05-16 RX ORDER — DROPERIDOL 2.5 MG/ML
0.62 INJECTION, SOLUTION INTRAMUSCULAR; INTRAVENOUS
Status: DISCONTINUED | OUTPATIENT
Start: 2023-05-16 | End: 2023-05-16 | Stop reason: HOSPADM

## 2023-05-16 RX ORDER — IPRATROPIUM BROMIDE AND ALBUTEROL SULFATE 2.5; .5 MG/3ML; MG/3ML
3 SOLUTION RESPIRATORY (INHALATION) ONCE AS NEEDED
Status: DISCONTINUED | OUTPATIENT
Start: 2023-05-16 | End: 2023-05-16 | Stop reason: HOSPADM

## 2023-05-16 RX ORDER — DIPHENHYDRAMINE HYDROCHLORIDE 50 MG/ML
INJECTION INTRAMUSCULAR; INTRAVENOUS AS NEEDED
Status: DISCONTINUED | OUTPATIENT
Start: 2023-05-16 | End: 2023-05-16 | Stop reason: SURG

## 2023-05-16 RX ORDER — LIDOCAINE HYDROCHLORIDE 10 MG/ML
0.5 INJECTION, SOLUTION EPIDURAL; INFILTRATION; INTRACAUDAL; PERINEURAL ONCE AS NEEDED
Status: DISCONTINUED | OUTPATIENT
Start: 2023-05-16 | End: 2023-05-16 | Stop reason: HOSPADM

## 2023-05-16 RX ORDER — MIDAZOLAM HYDROCHLORIDE 1 MG/ML
1 INJECTION INTRAMUSCULAR; INTRAVENOUS
Status: DISCONTINUED | OUTPATIENT
Start: 2023-05-16 | End: 2023-05-16 | Stop reason: HOSPADM

## 2023-05-16 RX ORDER — EPHEDRINE SULFATE 50 MG/ML
5 INJECTION, SOLUTION INTRAVENOUS ONCE AS NEEDED
Status: DISCONTINUED | OUTPATIENT
Start: 2023-05-16 | End: 2023-05-16 | Stop reason: HOSPADM

## 2023-05-16 RX ORDER — SODIUM CHLORIDE, SODIUM LACTATE, POTASSIUM CHLORIDE, AND CALCIUM CHLORIDE .6; .31; .03; .02 G/100ML; G/100ML; G/100ML; G/100ML
INJECTION, SOLUTION INTRAVENOUS AS NEEDED
Status: DISCONTINUED | OUTPATIENT
Start: 2023-05-16 | End: 2023-05-16 | Stop reason: HOSPADM

## 2023-05-16 RX ORDER — KETOROLAC TROMETHAMINE 30 MG/ML
INJECTION, SOLUTION INTRAMUSCULAR; INTRAVENOUS AS NEEDED
Status: DISCONTINUED | OUTPATIENT
Start: 2023-05-16 | End: 2023-05-16 | Stop reason: SURG

## 2023-05-16 RX ORDER — ACETAMINOPHEN 325 MG/1
650 TABLET ORAL EVERY 4 HOURS PRN
Qty: 60 TABLET | Refills: 0 | Status: SHIPPED | OUTPATIENT
Start: 2023-05-16

## 2023-05-16 RX ORDER — FAMOTIDINE 10 MG/ML
20 INJECTION, SOLUTION INTRAVENOUS ONCE
Status: COMPLETED | OUTPATIENT
Start: 2023-05-16 | End: 2023-05-16

## 2023-05-16 RX ORDER — ACETAMINOPHEN 650 MG
TABLET, EXTENDED RELEASE ORAL AS NEEDED
Status: DISCONTINUED | OUTPATIENT
Start: 2023-05-16 | End: 2023-05-16 | Stop reason: HOSPADM

## 2023-05-16 RX ORDER — HYDROMORPHONE HYDROCHLORIDE 1 MG/ML
0.5 INJECTION, SOLUTION INTRAMUSCULAR; INTRAVENOUS; SUBCUTANEOUS
Status: DISCONTINUED | OUTPATIENT
Start: 2023-05-16 | End: 2023-05-16 | Stop reason: HOSPADM

## 2023-05-16 RX ORDER — FLUMAZENIL 0.1 MG/ML
0.2 INJECTION INTRAVENOUS AS NEEDED
Status: DISCONTINUED | OUTPATIENT
Start: 2023-05-16 | End: 2023-05-16 | Stop reason: HOSPADM

## 2023-05-16 RX ORDER — BUPIVACAINE HYDROCHLORIDE 5 MG/ML
INJECTION, SOLUTION EPIDURAL; INTRACAUDAL
Status: COMPLETED | OUTPATIENT
Start: 2023-05-16 | End: 2023-05-16

## 2023-05-16 RX ORDER — DOCUSATE SODIUM 100 MG/1
100 CAPSULE, LIQUID FILLED ORAL 2 TIMES DAILY
Qty: 60 CAPSULE | Refills: 0 | Status: SHIPPED | OUTPATIENT
Start: 2023-05-16

## 2023-05-16 RX ORDER — ISOPROPYL ALCOHOL 70 ML/100ML
LIQUID TOPICAL AS NEEDED
Status: DISCONTINUED | OUTPATIENT
Start: 2023-05-16 | End: 2023-05-16 | Stop reason: HOSPADM

## 2023-05-16 RX ORDER — FENTANYL CITRATE 50 UG/ML
50 INJECTION, SOLUTION INTRAMUSCULAR; INTRAVENOUS
Status: DISCONTINUED | OUTPATIENT
Start: 2023-05-16 | End: 2023-05-16 | Stop reason: HOSPADM

## 2023-05-16 RX ORDER — DEXAMETHASONE SODIUM PHOSPHATE 4 MG/ML
INJECTION, SOLUTION INTRA-ARTICULAR; INTRALESIONAL; INTRAMUSCULAR; INTRAVENOUS; SOFT TISSUE AS NEEDED
Status: DISCONTINUED | OUTPATIENT
Start: 2023-05-16 | End: 2023-05-16 | Stop reason: SURG

## 2023-05-16 RX ORDER — MIDAZOLAM HYDROCHLORIDE 1 MG/ML
INJECTION INTRAMUSCULAR; INTRAVENOUS AS NEEDED
Status: DISCONTINUED | OUTPATIENT
Start: 2023-05-16 | End: 2023-05-16 | Stop reason: SURG

## 2023-05-16 RX ORDER — SODIUM CHLORIDE 0.9 % (FLUSH) 0.9 %
3 SYRINGE (ML) INJECTION EVERY 12 HOURS SCHEDULED
Status: DISCONTINUED | OUTPATIENT
Start: 2023-05-16 | End: 2023-05-16 | Stop reason: HOSPADM

## 2023-05-16 RX ORDER — ROCURONIUM BROMIDE 10 MG/ML
INJECTION, SOLUTION INTRAVENOUS AS NEEDED
Status: DISCONTINUED | OUTPATIENT
Start: 2023-05-16 | End: 2023-05-16 | Stop reason: SURG

## 2023-05-16 RX ORDER — DIPHENHYDRAMINE HYDROCHLORIDE 50 MG/ML
12.5 INJECTION INTRAMUSCULAR; INTRAVENOUS
Status: DISCONTINUED | OUTPATIENT
Start: 2023-05-16 | End: 2023-05-16 | Stop reason: HOSPADM

## 2023-05-16 RX ORDER — FENTANYL CITRATE 50 UG/ML
50 INJECTION, SOLUTION INTRAMUSCULAR; INTRAVENOUS ONCE AS NEEDED
Status: COMPLETED | OUTPATIENT
Start: 2023-05-16 | End: 2023-05-16

## 2023-05-16 RX ORDER — GLYCOPYRROLATE 0.2 MG/ML
INJECTION INTRAMUSCULAR; INTRAVENOUS AS NEEDED
Status: DISCONTINUED | OUTPATIENT
Start: 2023-05-16 | End: 2023-05-16 | Stop reason: SURG

## 2023-05-16 RX ORDER — PROMETHAZINE HYDROCHLORIDE 25 MG/1
25 TABLET ORAL ONCE AS NEEDED
Status: DISCONTINUED | OUTPATIENT
Start: 2023-05-16 | End: 2023-05-16 | Stop reason: HOSPADM

## 2023-05-16 RX ORDER — LABETALOL HYDROCHLORIDE 5 MG/ML
5 INJECTION, SOLUTION INTRAVENOUS
Status: DISCONTINUED | OUTPATIENT
Start: 2023-05-16 | End: 2023-05-16 | Stop reason: HOSPADM

## 2023-05-16 RX ORDER — SODIUM CHLORIDE 0.9 % (FLUSH) 0.9 %
3-10 SYRINGE (ML) INJECTION AS NEEDED
Status: DISCONTINUED | OUTPATIENT
Start: 2023-05-16 | End: 2023-05-16 | Stop reason: HOSPADM

## 2023-05-16 RX ORDER — HYDROCODONE BITARTRATE AND ACETAMINOPHEN 7.5; 325 MG/1; MG/1
1 TABLET ORAL EVERY 4 HOURS PRN
Qty: 42 TABLET | Refills: 0 | Status: SHIPPED | OUTPATIENT
Start: 2023-05-16 | End: 2023-05-18 | Stop reason: DRUGHIGH

## 2023-05-16 RX ORDER — CEFAZOLIN SODIUM 2 G/100ML
2 INJECTION, SOLUTION INTRAVENOUS ONCE
Status: COMPLETED | OUTPATIENT
Start: 2023-05-16 | End: 2023-05-16

## 2023-05-16 RX ORDER — HYDRALAZINE HYDROCHLORIDE 20 MG/ML
5 INJECTION INTRAMUSCULAR; INTRAVENOUS
Status: DISCONTINUED | OUTPATIENT
Start: 2023-05-16 | End: 2023-05-16 | Stop reason: HOSPADM

## 2023-05-16 RX ORDER — ONDANSETRON 4 MG/1
4 TABLET, FILM COATED ORAL EVERY 8 HOURS PRN
Qty: 30 TABLET | Refills: 0 | Status: SHIPPED | OUTPATIENT
Start: 2023-05-16

## 2023-05-16 RX ORDER — NALOXONE HCL 0.4 MG/ML
0.2 VIAL (ML) INJECTION AS NEEDED
Status: DISCONTINUED | OUTPATIENT
Start: 2023-05-16 | End: 2023-05-16 | Stop reason: HOSPADM

## 2023-05-16 RX ORDER — OXYCODONE AND ACETAMINOPHEN 7.5; 325 MG/1; MG/1
1 TABLET ORAL EVERY 4 HOURS PRN
Status: DISCONTINUED | OUTPATIENT
Start: 2023-05-16 | End: 2023-05-16 | Stop reason: HOSPADM

## 2023-05-16 RX ORDER — SODIUM CHLORIDE, SODIUM LACTATE, POTASSIUM CHLORIDE, CALCIUM CHLORIDE 600; 310; 30; 20 MG/100ML; MG/100ML; MG/100ML; MG/100ML
9 INJECTION, SOLUTION INTRAVENOUS CONTINUOUS
Status: DISCONTINUED | OUTPATIENT
Start: 2023-05-16 | End: 2023-05-16 | Stop reason: HOSPADM

## 2023-05-16 RX ORDER — LIDOCAINE HYDROCHLORIDE 20 MG/ML
INJECTION, SOLUTION EPIDURAL; INFILTRATION; INTRACAUDAL; PERINEURAL AS NEEDED
Status: DISCONTINUED | OUTPATIENT
Start: 2023-05-16 | End: 2023-05-16 | Stop reason: SURG

## 2023-05-16 RX ORDER — PROPOFOL 10 MG/ML
VIAL (ML) INTRAVENOUS AS NEEDED
Status: DISCONTINUED | OUTPATIENT
Start: 2023-05-16 | End: 2023-05-16 | Stop reason: SURG

## 2023-05-16 RX ORDER — FENTANYL CITRATE 50 UG/ML
INJECTION, SOLUTION INTRAMUSCULAR; INTRAVENOUS AS NEEDED
Status: DISCONTINUED | OUTPATIENT
Start: 2023-05-16 | End: 2023-05-16 | Stop reason: SURG

## 2023-05-16 RX ADMIN — ROCURONIUM BROMIDE 40 MG: 10 INJECTION, SOLUTION INTRAVENOUS at 08:27

## 2023-05-16 RX ADMIN — DIPHENHYDRAMINE HYDROCHLORIDE 12.5 MG: 50 INJECTION, SOLUTION INTRAMUSCULAR; INTRAVENOUS at 08:20

## 2023-05-16 RX ADMIN — KETOROLAC TROMETHAMINE 15 MG: 30 INJECTION, SOLUTION INTRAMUSCULAR at 09:13

## 2023-05-16 RX ADMIN — SCOPALAMINE 1 PATCH: 1 PATCH, EXTENDED RELEASE TRANSDERMAL at 07:52

## 2023-05-16 RX ADMIN — MIDAZOLAM 1 MG: 1 INJECTION INTRAMUSCULAR; INTRAVENOUS at 07:37

## 2023-05-16 RX ADMIN — BUPIVACAINE 10 ML: 13.3 INJECTION, SUSPENSION, LIPOSOMAL INFILTRATION at 07:40

## 2023-05-16 RX ADMIN — DEXAMETHASONE SODIUM PHOSPHATE 8 MG: 4 INJECTION, SOLUTION INTRAMUSCULAR; INTRAVENOUS at 08:20

## 2023-05-16 RX ADMIN — BUPIVACAINE HYDROCHLORIDE 10 ML: 5 INJECTION, SOLUTION EPIDURAL; INTRACAUDAL; PERINEURAL at 07:40

## 2023-05-16 RX ADMIN — SUGAMMADEX 200 MG: 100 INJECTION, SOLUTION INTRAVENOUS at 09:13

## 2023-05-16 RX ADMIN — FENTANYL CITRATE 50 MCG: 50 INJECTION, SOLUTION INTRAMUSCULAR; INTRAVENOUS at 07:37

## 2023-05-16 RX ADMIN — FENTANYL CITRATE 25 MCG: 50 INJECTION, SOLUTION INTRAMUSCULAR; INTRAVENOUS at 09:02

## 2023-05-16 RX ADMIN — CEFAZOLIN SODIUM 2 G: 2 INJECTION, SOLUTION INTRAVENOUS at 08:03

## 2023-05-16 RX ADMIN — GLYCOPYRROLATE 0.1 MG: 1 INJECTION INTRAMUSCULAR; INTRAVENOUS at 08:31

## 2023-05-16 RX ADMIN — FENTANYL CITRATE 25 MCG: 50 INJECTION, SOLUTION INTRAMUSCULAR; INTRAVENOUS at 08:26

## 2023-05-16 RX ADMIN — SODIUM CHLORIDE, POTASSIUM CHLORIDE, SODIUM LACTATE AND CALCIUM CHLORIDE 9 ML/HR: 600; 310; 30; 20 INJECTION, SOLUTION INTRAVENOUS at 07:32

## 2023-05-16 RX ADMIN — PROPOFOL 160 MG: 10 INJECTION, EMULSION INTRAVENOUS at 08:26

## 2023-05-16 RX ADMIN — ONDANSETRON 4 MG: 2 INJECTION INTRAMUSCULAR; INTRAVENOUS at 08:20

## 2023-05-16 RX ADMIN — LIDOCAINE HYDROCHLORIDE 80 MG: 20 INJECTION, SOLUTION EPIDURAL; INFILTRATION; INTRACAUDAL; PERINEURAL at 08:26

## 2023-05-16 RX ADMIN — PROPOFOL 150 MCG/KG/MIN: 10 INJECTION, EMULSION INTRAVENOUS at 08:31

## 2023-05-16 RX ADMIN — FAMOTIDINE 20 MG: 10 INJECTION INTRAVENOUS at 07:21

## 2023-05-16 RX ADMIN — MIDAZOLAM 2 MG: 1 INJECTION INTRAMUSCULAR; INTRAVENOUS at 08:53

## 2023-05-16 NOTE — OP NOTE
05/16/23     Pre-Operative Diagnosis: Left shoulder osteoarthritis, synovitis and subacromial/subdeltoid bursitis with multiple intra-articular loose bodies    Post-Operative Diagnosis:  Left shoulder osteoarthritis, synovitis and subacromial/subdeltoid bursitis with multiple intra-articular loose bodies    Procedure Performed:     1.  Arthroscopic debridement of partial-thickness rotator cuff tear   2.  Arthroscopic chondroplasty of humeral head with glenohumeral synovectomy and removal of loose bodies  3.  Arthroscopic subacromial bursectomy      Surgeon: Artie Sanchez MD     Assistant: Dottie Guevara whose assistance was critical for help with patient positioning, suctioning and irrigation, retraction, manipulation of the extremity for insertion of the implants, wound closure and application of the bandages.  Her assistance was critical to the success of this case.     Anesthesia: Regional followed general.     Complications: None.     Estimated Blood Loss: Less than 50 mL.     Implants: none    Specimens: * No orders in the log *    Brief Operative Indication:  Ms. Liz Willard had a history of a left shoulder pain which had been refractory to prolonged conservative treatment.  We talked about surgical and non-surgical treatment options.  She was determined to be a candidate for arthroscopic debridement.  I did however explain to her that ultimately she will likely require replacement.   I explained that surgical risks include infection, hematoma, hardware related complications including failure of fixation, cutout, arthrofibrosis, persistent pain and/or loss of motion, iatrogenic nerve and/or blood vessel injury resulting in permanent weakness, numbness or dysfunction, DVT, PE, positioning related neuropraxia, and anesthesia related complications resulting in death.  She knowledge understanding of the limited goals of the procedure and consented to proceed.    Description of Procedure in Detail:  The  patient and operative site were identified in the preoperative holding area.  The surgical site was marked with the patient's confirmation.  Adequate regional anesthesia of the left upper extremity was administered by the anesthesiologist.  The patient was then taken to the operating room and placed in the supine position.  Adequate general anesthesia was then administered.  The patient was repositioned into the lateral decubitus position.  All bony prominences were carefully padded and protected.  The left upper extremity was prepped and draped in the standard sterile fashion.  I cleaned the extremity with an alcohol solution.  A Hibiclens scrub was performed.  Lastly, the extremity was prepped with 2 ChloraPrep preps.  I allowed those to dry for approximately 3 minutes before the draping procedure was carried out.  A timeout was taken and preoperative antibiotics administered prior to surgical incision.      The arm was placed into 10 pounds of lateral traction.  A standard posterior portal was established.  The scope was inserted into the joint and directed anteriorly to the rotator interval where a standard anterior portal was established.  A 7 mm cannula was inserted into the joint and then a diagnostic arthroscopy performed.      As expected, she had fairly extensive degenerative changes in the shoulder.  There were areas of full-thickness chondral loss on both sides of the joint.  On the humeral side she had several large flaps of articular cartilage and partial delamination of the articular cartilage in the central humeral head.  There were multiple small cartilaginous loose bodies in the joint as well.  She had fraying of the superior and posterior labrum.  The biceps had already been addressed in a previous procedure.  She had partial-thickness low-grade tearing of the anterior supraspinatus.  The depth of this tearing measured approximately 2 to 3 mm.    A shaver was inserted and used to perform a  chondroplasty of the humeral head.  The shaver was also used to remove the multiple cartilaginous loose bodies.  None of these were big enough to justify use of a grasper and I was able to get these just with a shaver.  I debrided the rotator cuff from the articular side with a shaver.  I also debrided the labrum at this point.  She had extensive synovitis of the rotator interval and superior capsule.  This was also debrided with a shaver.  Multiple images were taken and saved.  No further pathology was noted in the joint.      Having completed the work in the joint, I directed my attention to the subacromial space.  A standard lateral portal was established.  There was an extensive amount of inflamed appearing bursal tissue which was removed with a shaver.  She seemed to have plenty of space and a decompression was deemed unnecessary.  Her rotator cuff was examined from the bursal side.  This was completely intact.  Images from both the posterior and lateral portals were taken and saved at this point.  No further pathology was noted.    The wounds were copiously irrigated out with sterile saline and closed in a layered fashion using Monocryl for the deep tissues and nylon for the skin.  Sterile dressings were applied.  The drapes were withdrawn.  The arm was placed in a sling.  She was awakened and taken to the recovery room in good condition.    Artie Sanchez MD    05/16/23

## 2023-05-16 NOTE — DISCHARGE INSTRUCTIONS
What to expect after a Nerve Block    Nerve blocks administered to block pain affect many types of nerves, including those nerves that control movement, pain, and normal sensation. Following a nerve block, you may notice some bruising at the site where the block was given. You may experience sensations such as: numbness of the affected area or limb, tingling, heaviness (that is the limb feels heavy to you), weakness or inability to move the affected arm or leg, or a feeling as if your arm or leg has “fallen asleep.”     A nerve block can last from 2 to 36 hours depending on the medications used.  Usually the weakness wears off first followed by the tingling and heaviness. As the block wears off, you may begin to notice pain; however, this sequence of events may occur in any order. Typically, you will be able to move your limb before you will feel it. Once a nerve block begins to wear off, the effects are usually completely gone within 60 minutes.  If you experience continued side effects that you believe are block related for longer than 48 hours, please call your healthcare provider. Please see block-specific instructions below.    Instructions for any block involving the shoulder or arm  If you have had any kind of shoulder/arm block, you will go home with your arm in a sling. Wear the sling until the block has completely worn off. You may be required to wear it for a longer period of time per your surgeon’s recommendations.  If you have had a shoulder/arm block, it is a good idea to sleep on a recliner with pillows under your arm.    You may experience symptoms such as:  Shortness of breath  Hoarseness   Blurry vision  Unequal pupils  Drooping of your face on the same side as the block was performed    These are side effects associated with this kind of block and should go away within 12 hours.    Note: If you have severe or prolonged shortness of breath, please seek medical assistance as soon as possible.      Protection of a “blocked” arm or leg (limb)  After a nerve block, you cannot feel pain, pressure, or extremes of temperature in the affected limb. And because of this, your blocked limb is at more risk for injury. For example, it is possible to burn your limb on an extremely hot surface without feeling it.     When resting, it is important to reposition your limb periodically to avoid prolonged pressure on it. This may require the use of pillows and padding.    While sleeping, you should avoid rolling onto the affected limb or putting too much pressure on it.     If you have a cast or tight dressing, check the color of your fingers or toes of the affected limb. Call your surgeon if they look discolored (that is, dusky, dark colored).    Use caution in cold weather. Cover your limb appropriately to protect it from the cold.      Pain Management:    Your surgeon will give you a prescription for pain medication. Begin taking this before the nerve block wears off. Bear in mind that sometimes the block can wear off in the middle of the night.      Pendulum Exercises for Post Op Shoulder Surgery      Lean over with your good arm supported on a table or chair.  Relax the injured arm and allow it to hang straight down at your side.  Slowly begin to swing the relaxed arm back and forth.  Then swing it side to side.  Next, move it in a [place]. Now,  reverse the direction.        Generally, you should spend about 5 minutes doing this exercise.  It should be done 2-3 times daily or as directed by your physician.

## 2023-05-16 NOTE — BRIEF OP NOTE
SHOULDER ARTHROSCOPY  Progress Note    Roula Willard  5/16/2023    Pre-op Diagnosis:   Chronic left shoulder pain [M25.512, G89.29]       Post-Op Diagnosis Codes:     * Chronic left shoulder pain [M25.512, G89.29]    Procedure/CPT® Codes:        Procedure(s):  SHOULDER ARTHROSCOPY WITH SUBACROMIAL DECOMPRESSION, synvectomy and debridement              Surgeon(s):  Artie Sanchez MD    Anesthesia: General with Block    Staff:   Circulator: María Antunez RN  Scrub Person: Jelani Stewart  Vendor Representative: Martínez Lynn  Assistant: Dottie Guevara  Assistant: Dottie Guevara      Estimated Blood Loss: minimal    Urine Voided: * No values recorded between 5/16/2023  8:13 AM and 5/16/2023  9:28 AM *    Specimens:                None          Drains: * No LDAs found *    Findings: See dictation        Complications: None    Assistant: Dottie Guevara  was responsible for performing the following activities: Retraction and their skilled assistance was necessary for the success of this case.    Artie Sanchez MD     Date: 5/16/2023  Time: 09:33 EDT

## 2023-05-16 NOTE — H&P
History & Physical       Patient: Roula Willard    YOB: 1965    Medical Record Number: [id number]    Chief Complaints: Preop for left shoulder debridement    History of Present Illness: 57 y.o. female presents today in anticipation of upcoming surgery.  Denies any changes to medical history.  Denies any changes to her symptomatology.    Allergies:   Allergies   Allergen Reactions   • Latex Unknown - High Severity   • Penicillins Unknown - High Severity     Family allergy. Pt. Don't take this medication   • Iodinated Contrast Media Rash       Home Medications:  No current facility-administered medications for this encounter.    Past Medical History:   Diagnosis Date   • Allergic    • Anesthesia complication     HYPOTENSION   • Arthritis    • Hyperlipidemia    • Left shoulder pain    • Numbness     LEFT THUMB AND INDEX FINGER   • PONV (postoperative nausea and vomiting)           Past Surgical History:   Procedure Laterality Date   • BUNIONECTOMY Right     GREAT TOE   • KNEE SURGERY Bilateral     MULTIPLE TENDON REPAIR   • MANDIBLE SURGERY     • ROTATOR CUFF REPAIR Right    • SHOULDER MANIPULATION Left    • TRIGGER FINGER RELEASE Right     FINGERS X 2 AND CARPAL TUNNEL          Social History     Occupational History   • Not on file   Tobacco Use   • Smoking status: Never   • Smokeless tobacco: Never   Vaping Use   • Vaping Use: Never used   Substance and Sexual Activity   • Alcohol use: Yes     Comment: OCCASIONALLY   • Drug use: No   • Sexual activity: Defer      Social History     Social History Narrative   • Not on file          Family History   Problem Relation Age of Onset   • Malig Hyperthermia Neg Hx        Review of Systems:  A 14 point review of systems is reviewed with the patient.  Pertinent positives are listed above.  All others are negative.    Physical Exam: 57 y.o. female    There were no vitals filed for this visit.    General:  Patient is awake and alert.  Appears in no  acute distress or discomfort.    Psych:  Affect and demeanor are appropriate.    Eyes:  Conjunctiva and sclera appear grossly normal.  Eyes track well and EOM seem to be intact.    Ears:  No gross abnormalities.  Hearing adequate for the exam.    Cardiovascular:  Regular rate and rhythm.    Lungs:  Good chest expansion.  Breathing unlabored.    Lymph:  No palpable adenopathy about neck or axilla.    Left upper extremity:  Skin benign and intact without evidence for swelling, masses or atrophy.  Exam otherwise deferred at this time.    Diagnostic Tests:  Lab Results   Component Value Date    GLUCOSE 100 (H) 05/02/2023    CALCIUM 10.1 05/02/2023     05/02/2023    K 4.4 05/02/2023    CO2 27.4 05/02/2023     (H) 05/02/2023    BUN 12 05/02/2023    CREATININE 1.08 (H) 05/02/2023    EGFRIFNONA 59 (L) 11/23/2020    BCR 11.1 05/02/2023    ANIONGAP 7.6 05/02/2023     Lab Results   Component Value Date    WBC 5.87 05/02/2023    HGB 14.2 05/02/2023    HCT 41.6 05/02/2023    MCV 92.0 05/02/2023     05/02/2023     No results found for: INR, PROTIME    Assessment:  Left shoulder osteoarthritis, synovitis and intra-articular loose bodies    Plan: Plan to proceed with surgery as planned.  I once again explained the limited goals of this procedure.  Hope is that this will help her but ultimately she will require replacement down the road.  I again offered her a chance to ask any questions about the upcoming procedure. She stated that she had a good understanding of everything and had no questions.    Artie Sanchez MD  05/16/23

## 2023-05-16 NOTE — ANESTHESIA POSTPROCEDURE EVALUATION
Patient: Roula Willard    Procedure Summary     Date: 05/16/23 Room / Location:  [facility] [address] [place] [address] [facility]    Anesthesia Start: 0813 Anesthesia Stop: 0933    Procedure: SHOULDER ARTHROSCOPY WITH SUBACROMIAL DECOMPRESSION, synvectomy and debridement (Left: Shoulder) Diagnosis:       Chronic left shoulder pain      (Chronic left shoulder pain [M25.512, G89.29])    Surgeons: Artie Sanchez MD Provider: Parveen Cornelius MD    Anesthesia Type: general ASA Status: 2          Anesthesia Type: general    Vitals  Vitals Value Taken Time   /71 05/16/23 1045   Temp 36.4 °C (97.5 °F) 05/16/23 1040   Pulse 65 05/16/23 1048   Resp 16 05/16/23 1030   SpO2 95 % 05/16/23 1048   Vitals shown include unvalidated device data.        Post Anesthesia Care and Evaluation    Patient location during evaluation: bedside  Patient participation: complete - patient participated  Level of consciousness: awake and alert  Pain management: adequate    Airway patency: patent  Anesthetic complications: No anesthetic complications  PONV Status: controlled  Cardiovascular status: blood pressure returned to baseline and acceptable  Respiratory status: acceptable  Hydration status: acceptable

## 2023-05-16 NOTE — ANESTHESIA PREPROCEDURE EVALUATION
Anesthesia Evaluation     history of anesthetic complications: PONV  NPO Solid Status: > 8 hours  NPO Liquid Status: > 8 hours           Airway   Mallampati: I  TM distance: >3 FB  No difficulty expected  Dental      Pulmonary    Cardiovascular   Exercise tolerance: good (4-7 METS)    (+) hyperlipidemia,       Neuro/Psych  (+) numbness,    GI/Hepatic/Renal/Endo      Musculoskeletal     Abdominal    Substance History      OB/GYN          Other   arthritis,                      Anesthesia Plan    ASA 2     general     (Reg[facility] for [facility])  intravenous induction     Anesthetic plan, risks, benefits, and alternatives have been provided, discussed and informed consent has been obtained with: patient.    Plan discussed with CRNA.        CODE STATUS:

## 2023-05-16 NOTE — ANESTHESIA PROCEDURE NOTES
Airway  Urgency: elective    Date/Time: 5/16/2023 8:29 AM  Airway not difficult    General Information and Staff    Patient location during procedure: OR  Anesthesiologist: Parveen Cornelius MD  CRNA/CAA: Annabel Sam CRNA    Indications and Patient Condition  Indications for airway management: airway protection    Preoxygenated: yes  MILS not maintained throughout  Mask difficulty assessment: 2 - vent by mask + OA or adjuvant +/- NMBA    Final Airway Details  Final airway type: endotracheal airway      Successful airway: ETT  Cuffed: yes   Successful intubation technique: direct laryngoscopy  Facilitating devices/methods: intubating stylet and cricoid pressure  Endotracheal tube insertion site: oral  Blade: Day  Blade size: 3  ETT size (mm): 7.0  Cormack-Lehane Classification: grade I - full view of glottis  Placement verified by: chest auscultation and capnometry   Cuff volume (mL): 7  Measured from: lips  Number of attempts at approach: 1  Assessment: lips, teeth, and gum same as pre-op and atraumatic intubation    Additional Comments  Airway exam prior to DL, teeth/lips inspected. Preoxygenated with 100% O2; sniffing position, easy mask ventilation. Eyes taped. Atraumatic intubation. Lips and teeth intact, no damage. ETT connected to vent. Confirmed EBBS, +EtCO2.

## 2023-05-16 NOTE — ANESTHESIA PROCEDURE NOTES
Peripheral Block      Patient reassessed immediately prior to procedure    Patient location during procedure: pre-op  Start time: 5/16/2023 7:40 AM  Stop time: 5/16/2023 7:45 AM  Reason for block: procedure for pain, at surgeon's request and post-op pain management  Performed by  Anesthesiologist: Parveen Cornelius MD  Preanesthetic Checklist  Completed: patient identified, IV checked, site marked, risks and benefits discussed, surgical consent, monitors and equipment checked, pre-op evaluation and timeout performed  Prep:  Pt Position: supine  Sterile barriers:cap, gloves, sterile barriers, washed/disinfected hands and mask  Prep: ChloraPrep  Patient monitoring: blood pressure monitoring, continuous pulse oximetry and EKG  Procedure    Sedation: yes  Performed under: local infiltration  Guidance:ultrasound guided  Images:still images obtained, printed/placed on chart    Laterality:left  Block Type:interscalene  Injection Technique:single-shot  Needle Type:echogenic  Resistance on Injection: none    Medications Used: bupivacaine liposome (EXPAREL) 1.3 % injection - Infiltration   10 mL - 5/16/2023 7:40:00 AM  bupivacaine (PF) (MARCAINE) 0.5 % injection - Injection   10 mL - 5/16/2023 7:40:00 AM      Post Assessment  Injection Assessment: negative aspiration for heme, no paresthesia on injection and incremental injection  Patient Tolerance:comfortable throughout block  Complications:no  Additional Notes  USG used to verify needle placement and medication administration

## 2023-05-17 ENCOUNTER — TELEPHONE (OUTPATIENT)
Dept: ORTHOPEDIC SURGERY | Facility: CLINIC | Age: 58
End: 2023-05-17
Payer: COMMERCIAL

## 2023-05-17 NOTE — TELEPHONE ENCOUNTER
I spoke with her.  She is doing well today.  Her block was wearing off.  She is just taking Tylenol and ibuprofen for the pain.  She will follow-up as scheduled.  She did not have any questions.

## 2023-05-18 DIAGNOSIS — M75.02 ADHESIVE CAPSULITIS OF LEFT SHOULDER: Primary | ICD-10-CM

## 2023-05-18 RX ORDER — HYDROCODONE BITARTRATE AND ACETAMINOPHEN 10; 325 MG/1; MG/1
1 TABLET ORAL EVERY 6 HOURS PRN
Qty: 30 TABLET | Refills: 0 | Status: SHIPPED | OUTPATIENT
Start: 2023-05-18

## 2023-05-18 NOTE — TELEPHONE ENCOUNTER
Patient called [facility] is out of the 7.5 Niotaze. She said she had spoke to Dr. Sanchez and he said if she couldn't get it they could change it. [facility] is in surgery I will send to a provider in office.

## 2023-05-24 ENCOUNTER — OFFICE VISIT (OUTPATIENT)
Dept: ORTHOPEDIC SURGERY | Facility: CLINIC | Age: 58
End: 2023-05-24
Payer: OTHER MISCELLANEOUS

## 2023-05-24 VITALS — BODY MASS INDEX: 27.15 KG/M2 | TEMPERATURE: 97.1 F | HEIGHT: 67 IN | WEIGHT: 173 LBS

## 2023-05-24 DIAGNOSIS — Z09 SURGERY FOLLOW-UP: Primary | ICD-10-CM

## 2023-05-24 NOTE — PROGRESS NOTES
Roula Willard : 1965 MRN: [id number] DATE: 2023    CC: 1 week s/p left shoulder arthroscopy    HPI: Patient returns to clinic today for follow up.  Reports pain is well controlled.  Reports she is having numbness and tingling in her thumb, index, and middle fingers.  She says she has some numbness and tingling in the forearm as well.  She says she had nerve studies performed approximately 2 years ago which were negative for carpal tunnel syndrome at that time.  Denies fevers, drainage, redness or other concerning symptoms.      Vitals:    23 0925   Temp: 97.1 °F (36.2 °C)       Exam:  Wounds appear well-approximated.  Arm and forearm soft.  Shoulder moves fluidly with pendulums.  No focal areas of exquisite tenderness.  Negative Phalen's maneuver over the carpal tunnel.  No evident instability of the wrist or thumb.  Good , pinch, finger and thumb abduction strength.  Intact sensation throughout the hand.  Palpable radial pulse.  Brisk capillary refill.      Impression:  1 week s/p left shoulder rotator cuff debridement, chondroplasty of humeral head, removal of loose bodies, and subacromial bursectomy    Plan:    1.  Begin PT per protocol--encouraged patient to progress motion as tolerated and demonstrated home exercise program.  2.  Patient to remain off work until follow-up with Dr. Sanchez in 3 weeks.   3.  Counseled the patient about appropriate activity modifications and restrictions.  4.  I encouraged her to monitor her symptoms of numbness and tingling.  If her symptoms persist or worsen, we can certainly perform a complete work up for this issue.  She verbalized understanding.     Amelie Regalado, ANANT     2023

## 2023-05-25 ENCOUNTER — TELEPHONE (OUTPATIENT)
Dept: ORTHOPEDIC SURGERY | Facility: CLINIC | Age: 58
End: 2023-05-25

## 2023-05-25 NOTE — TELEPHONE ENCOUNTER
Caller: WU    Relationship: SELF    Best call back number: [phone] EXT [phone]    What form or medical record are you requesting: OFFICE VISIT NOTE AND WORK STATUS     Who is requesting this form or medical record from you: GENEEMILEE    How would you like to receive the form or medical records (pick-up, mail, fax): [phone]      Timeframe paperwork needed: ASAP    Additional notes: NA

## 2023-05-26 ENCOUNTER — TELEPHONE (OUTPATIENT)
Dept: ORTHOPEDIC SURGERY | Facility: CLINIC | Age: 58
End: 2023-05-26

## 2023-05-26 NOTE — TELEPHONE ENCOUNTER
Caller: WU    Relationship: [employer]    Best call back number:     What form or medical record are you requesting: PROGRESS NOTES AND WORK STATUS FROM 5/24/23    Who is requesting this form or medical record from you: [employer]    How would you like to receive the form or medical records (pick-up, mail, fax): FAX  If fax, what is the fax number:   [phone]    Timeframe paperwork needed: ASAP

## 2023-06-14 ENCOUNTER — OFFICE VISIT (OUTPATIENT)
Dept: ORTHOPEDIC SURGERY | Facility: CLINIC | Age: 58
End: 2023-06-14
Payer: OTHER MISCELLANEOUS

## 2023-06-14 VITALS — TEMPERATURE: 98 F | BODY MASS INDEX: 26.97 KG/M2 | HEIGHT: 67 IN | WEIGHT: 171.8 LBS

## 2023-06-14 DIAGNOSIS — Z09 SURGERY FOLLOW-UP: Primary | ICD-10-CM

## 2023-06-14 RX ORDER — IBUPROFEN 800 MG/1
800 TABLET ORAL 3 TIMES DAILY
Qty: 90 TABLET | Refills: 0 | Status: SHIPPED | OUTPATIENT
Start: 2023-06-14

## 2023-06-14 RX ORDER — SENNOSIDES 8.6 MG
650 CAPSULE ORAL EVERY 8 HOURS PRN
Qty: 60 TABLET | Refills: 0 | Status: SHIPPED | OUTPATIENT
Start: 2023-06-14

## 2023-06-14 NOTE — PROGRESS NOTES
Roula Willard : 1965 MRN: [id number] DATE: 2023    CC: 1 month s/p left shoulder arthroscopy    HPI: Patient returns to clinic today for follow up.  Reports pain is much better.  Reports good progress with therapy.  She says she feels like her motion now is better than before the surgery.    Vitals:    23 1418   Temp: 98 °F (36.7 °C)       Exam:  Wounds appear healed.  Shoulder moves fluidly and her motion is on track.  Good motor and sensory function in the hand and wrist.  Palpable radial pulse with brisk capillary refill.    Impression:  1 month s/p left shoulder arthroscopic debridement and synovectomy    Plan:    1.  Continue PT per protocol--encouraged patient to progress into strengthening as tolerated and demonstrated home exercise program.  2.  Discussed appropriate activity modification and restrictions  3.  Follow up in 6 weeks  4.  She did request and was given refills for ibuprofen and Tylenol.  Risk were discussed..    Artie Sanchez MD

## 2023-06-15 ENCOUNTER — TELEPHONE (OUTPATIENT)
Dept: ORTHOPEDIC SURGERY | Facility: CLINIC | Age: 58
End: 2023-06-15

## 2023-06-15 NOTE — TELEPHONE ENCOUNTER
Caller: SARAH EARL    Relationship: [employer]     Best call back number: [phone] EXT [phone] DIOGO PENDLETON     What form or medical record are you requesting: OFFICE NOTES 06/14/23, RFA REQUEST IF APPLICABLE, WORK STATUS     Who is requesting this form or medical record from you: [employer]     How would you like to receive the form or medical records (pick-up, mail, fax): FAX  If fax, what is the fax number: [phone]  Timeframe paperwork needed: ASAP

## 2023-07-31 ENCOUNTER — OFFICE VISIT (OUTPATIENT)
Dept: ORTHOPEDIC SURGERY | Facility: CLINIC | Age: 58
End: 2023-07-31
Payer: OTHER MISCELLANEOUS

## 2023-07-31 VITALS — WEIGHT: 170.8 LBS | HEIGHT: 67 IN | TEMPERATURE: 97.3 F | BODY MASS INDEX: 26.81 KG/M2

## 2023-07-31 DIAGNOSIS — M25.512 CHRONIC LEFT SHOULDER PAIN: ICD-10-CM

## 2023-07-31 DIAGNOSIS — G89.29 CHRONIC LEFT SHOULDER PAIN: ICD-10-CM

## 2023-07-31 DIAGNOSIS — Z09 SURGERY FOLLOW-UP: Primary | ICD-10-CM

## 2023-07-31 RX ORDER — ACETAMINOPHEN 500 MG
500 TABLET ORAL EVERY 6 HOURS PRN
Qty: 60 TABLET | Refills: 0 | Status: SHIPPED | OUTPATIENT
Start: 2023-07-31

## 2023-07-31 RX ORDER — LIDOCAINE HYDROCHLORIDE 10 MG/ML
2 INJECTION, SOLUTION EPIDURAL; INFILTRATION; INTRACAUDAL; PERINEURAL
Status: COMPLETED | OUTPATIENT
Start: 2023-07-31 | End: 2023-07-31

## 2023-07-31 RX ORDER — IBUPROFEN 800 MG/1
800 TABLET ORAL 3 TIMES DAILY
Qty: 90 TABLET | Refills: 0 | Status: SHIPPED | OUTPATIENT
Start: 2023-07-31

## 2023-07-31 RX ORDER — METHYLPREDNISOLONE ACETATE 80 MG/ML
1 INJECTION, SUSPENSION INTRA-ARTICULAR; INTRALESIONAL; INTRAMUSCULAR; SOFT TISSUE
Status: COMPLETED | OUTPATIENT
Start: 2023-07-31 | End: 2023-07-31

## 2023-07-31 RX ADMIN — LIDOCAINE HYDROCHLORIDE 2 ML: 10 INJECTION, SOLUTION EPIDURAL; INFILTRATION; INTRACAUDAL; PERINEURAL at 14:11

## 2023-07-31 RX ADMIN — METHYLPREDNISOLONE ACETATE 1 ML: 80 INJECTION, SUSPENSION INTRA-ARTICULAR; INTRALESIONAL; INTRAMUSCULAR; SOFT TISSUE at 14:11

## 2023-08-30 ENCOUNTER — OFFICE VISIT (OUTPATIENT)
Dept: ORTHOPEDIC SURGERY | Facility: CLINIC | Age: 58
End: 2023-08-30
Payer: OTHER MISCELLANEOUS

## 2023-08-30 VITALS — HEIGHT: 67 IN | WEIGHT: 170.6 LBS | TEMPERATURE: 97.4 F | BODY MASS INDEX: 26.78 KG/M2

## 2023-08-30 DIAGNOSIS — Z09 SURGERY FOLLOW-UP: Primary | ICD-10-CM

## 2023-08-30 NOTE — LETTER
August 30, 2023     Patient: Roula Willard   YOB: 1965   Date of Visit: 8/30/2023       To Whom It May Concern:    It is my medical opinion that Roula Willard may return to full duty immediately with no restrictions.      Sincerely,        Artie Sanchez MD

## 2023-08-30 NOTE — PROGRESS NOTES
CC:  s/p left shoulder arthroscopy    Ms. Liz Willard follows up for her left shoulder.  When I last saw her I did an injection.  She tells me it helped tremendously.  The shoulder is feeling much better.  She says that she is able to do everything she wants to do and she feels capable of going back to work.    Her motion is good.  Strength in her rotator cuff and deltoid are also very good.  Just some mild anterior tenderness    Assessment: Follow-up now 3.5 months status post left shoulder debridement and decompression in patient with known shoulder osteoarthritis    Plan: She seems to be doing well.  I am going to release her to go back to work without restriction.  Ultimately she is likely to require further treatment including injections and likely replacement at some point.  As long as she is doing well we will continue to manage her expectantly.  No follow-up scheduled at this point.  I will wait to hear from her.    Artie Sanchez MD

## 2023-09-19 ENCOUNTER — TELEPHONE (OUTPATIENT)
Dept: ORTHOPEDIC SURGERY | Facility: CLINIC | Age: 58
End: 2023-09-19

## 2023-09-19 NOTE — TELEPHONE ENCOUNTER
Caller: LISA    Relationship: [employer]     Best call back number:     What is the best time to reach you: ANY    Who are you requesting to speak with (clinical staff, provider,  specific staff member): CLINICAL    What was the call regarding: THEY ARE CALLING TO GET STATUS OF MMI LETTER SENT ON 9/6/23  DOCUMENT CAN BE FAXED BACK  [phone]    Is it okay if the provider responds through [employer]: PLEASE CALL

## 2023-11-15 ENCOUNTER — TELEPHONE (OUTPATIENT)
Dept: ORTHOPEDIC SURGERY | Facility: CLINIC | Age: 58
End: 2023-11-15
Payer: COMMERCIAL

## 2023-11-15 NOTE — TELEPHONE ENCOUNTER
Caller: Roula Cantu    Relationship to patient: Self    Best call back number: [phone]    Patient is needing: PATIENT IS WANTING TO GET ANOTHER CORTISONE INJECTION IN HER LEFT SHOULDER. LAST ON RECEIVED 7/31/23. SHE STATED IT IS A W/C INJURY AND SHE WANTS TO MAKE SURE THAT THEY WILL BE COVERING THIS BEFORE SHE GETS IT SCHEDULED. PLEASE ADVISE.

## 2023-11-16 NOTE — TELEPHONE ENCOUNTER
CALLED PATIENT AND LET HER KNOW I LEFT MESSAGE WITH CORA CASIANO [phone] WHICH IS HER ADJUSTOR.

## 2024-01-10 ENCOUNTER — OFFICE VISIT (OUTPATIENT)
Dept: ORTHOPEDIC SURGERY | Facility: CLINIC | Age: 59
End: 2024-01-10
Payer: OTHER MISCELLANEOUS

## 2024-01-10 VITALS — HEIGHT: 67 IN | WEIGHT: 169.5 LBS | TEMPERATURE: 98.4 F | BODY MASS INDEX: 26.6 KG/M2

## 2024-01-10 DIAGNOSIS — G89.29 CHRONIC LEFT SHOULDER PAIN: Primary | ICD-10-CM

## 2024-01-10 DIAGNOSIS — M25.512 CHRONIC LEFT SHOULDER PAIN: Primary | ICD-10-CM

## 2024-01-10 DIAGNOSIS — M19.019 ARTHRITIS OF SHOULDER: ICD-10-CM

## 2024-01-10 RX ORDER — METHYLPREDNISOLONE ACETATE 80 MG/ML
80 INJECTION, SUSPENSION INTRA-ARTICULAR; INTRALESIONAL; INTRAMUSCULAR; SOFT TISSUE
Status: COMPLETED | OUTPATIENT
Start: 2024-01-10 | End: 2024-01-10

## 2024-01-10 RX ORDER — LIDOCAINE HYDROCHLORIDE 10 MG/ML
2 INJECTION, SOLUTION EPIDURAL; INFILTRATION; INTRACAUDAL; PERINEURAL
Status: COMPLETED | OUTPATIENT
Start: 2024-01-10 | End: 2024-01-10

## 2024-01-10 RX ADMIN — METHYLPREDNISOLONE ACETATE 80 MG: 80 INJECTION, SUSPENSION INTRA-ARTICULAR; INTRALESIONAL; INTRAMUSCULAR; SOFT TISSUE at 09:11

## 2024-01-10 RX ADMIN — LIDOCAINE HYDROCHLORIDE 2 ML: 10 INJECTION, SOLUTION EPIDURAL; INFILTRATION; INTRACAUDAL; PERINEURAL at 09:11

## 2024-01-10 NOTE — PROGRESS NOTES
Chief complaint: Left shoulder pain    Shruti follows up today for her left shoulder.  She says she has been more active lately and that the shoulder is bothering her.      Left shoulder skin is benign.  No effusion or increased warmth.  Moderate tenderness over the rotator interval and anterior aspect of her shoulder.  She does have mechanical clicking with circumduction.  This is not particularly painful for her.  Abduction and elevation are both mildly uncomfortable    Assessment: Left shoulder osteoarthritis status post arthroscopic debridement and decompression    Plan: We discussed her options.  She wanted to try a glenohumeral injection.  The risk, benefits and alternatives were discussed.  She consented and injection was performed as describe below.  Going forward, I will release her to follow-up with me as needed.    Large Joint Arthrocentesis: L glenohumeral  Date/Time: 1/10/2024 9:11 AM  Consent given by: patient  Site marked: site marked  Timeout: Immediately prior to procedure a time out was called to verify the correct patient, procedure, equipment, support staff and site/side marked as required   Supporting Documentation  Indications: pain   Procedure Details  Location: shoulder - L glenohumeral  Preparation: Patient was prepped and draped in the usual sterile fashion  Needle gauge: 21G.  Approach: anterior  Medications administered: 80 mg methylPREDNISolone acetate 80 MG/ML; 2 mL lidocaine PF 1% 1 %  Patient tolerance: patient tolerated the procedure well with no immediate complications

## 2024-03-05 ENCOUNTER — TELEPHONE (OUTPATIENT)
Dept: ORTHOPEDIC SURGERY | Facility: CLINIC | Age: 59
End: 2024-03-05

## 2024-03-05 NOTE — TELEPHONE ENCOUNTER
Caller: COLLIN FLORIAN    Relationship to patient: SELF    Best call back number: [phone]    Chief complaint: RIGHT SHOULDER    Type of visit: CORTISONE INJECTION    Requested date: ASAP    If rescheduling, when is the original appointment: N/A     Additional notes: [employer]

## 2024-05-28 ENCOUNTER — TELEPHONE (OUTPATIENT)
Dept: ORTHOPEDIC SURGERY | Facility: CLINIC | Age: 59
End: 2024-05-28

## 2024-05-28 RX ORDER — SENNOSIDES 8.6 MG
650 CAPSULE ORAL EVERY 8 HOURS PRN
Qty: 60 TABLET | Refills: 0 | Status: SHIPPED | OUTPATIENT
Start: 2024-05-28

## 2024-05-28 RX ORDER — IBUPROFEN 800 MG/1
800 TABLET ORAL 3 TIMES DAILY
Qty: 90 TABLET | Refills: 0 | Status: SHIPPED | OUTPATIENT
Start: 2024-05-28

## 2024-05-28 NOTE — TELEPHONE ENCOUNTER
Caller: Liz Willard Roula S    Relationship: Self    Best call back number: [phone]    Requested Prescriptions:   Requested Prescriptions     Pending Prescriptions Disp Refills    acetaminophen (Tylenol 8 Hour) 650 MG 8 hr tablet 60 tablet 0     Sig: Take 1 tablet by mouth Every 8 (Eight) Hours As Needed for Mild Pain.    ibuprofen (ADVIL,MOTRIN) 800 MG tablet 90 tablet 0     Sig: Take 1 tablet by mouth 3 (Three) Times a Day.        Pharmacy where request should be sent: [facility] #[id number] - [address] [phone] [facility] [phone] FX     Last office visit with prescribing clinician: 1/10/2024   Last telemedicine visit with prescribing clinician: Visit date not found   Next office visit with prescribing clinician: Visit date not found     Additional details provided by patient: na     Does the patient have less than a 3 day supply:  [x] Yes  [] No    Would you like a call back once the refill request has been completed: [x] Yes [] No    If the office needs to give you a call back, can they leave a voicemail: [x] Yes [] No    Stephy Puente Rep   05/28/24 10:54 EDT

## 2024-05-28 NOTE — TELEPHONE ENCOUNTER
Caller: Roula Cantu    Relationship: Self    Best call back number: [phone]    Who is your current provider: [facility]    [phone]    Is your current provider offboarding? YES     Who would you like your new provider to be:      What are your reasons for transferring care: PATIENT IS HAVING A HARD TIME GETTING SCHEDULED DUE TO [employer] AND PATIENT STATES THEY ACT LIKE THEY DO NOT HAVE HER INFORMATION FROM [employer] AND HER  STATES THAT SHE SHOULD BE ABLE TO CALL AND GET SCHEDULED ASAP.     Additional notes: NA

## 2024-06-29 DIAGNOSIS — M19.019 ARTHRITIS OF SHOULDER: Primary | ICD-10-CM

## 2024-07-01 RX ORDER — IBUPROFEN 800 MG/1
800 TABLET ORAL 3 TIMES DAILY
Qty: 90 TABLET | Refills: 0 | Status: SHIPPED | OUTPATIENT
Start: 2024-07-01

## 2024-07-01 NOTE — TELEPHONE ENCOUNTER
Left detailed message for patient that rx is refilled, but, if she wants more in the future, she will have to get from PCP.  This way he/she may monitor her kidney function.

## (undated) DEVICE — SKIN PREP TRAY W/CHG: Brand: MEDLINE INDUSTRIES, INC.

## (undated) DEVICE — SUT ETHLN 3/0 PS1 18IN 1663H

## (undated) DEVICE — GLV SURG BIOGEL LTX PF 6 1/2

## (undated) DEVICE — SOL ISO/ALC 70PCT 4OZ

## (undated) DEVICE — GLV SURG SIGNATURE ESSENTIAL PF LTX SZ7

## (undated) DEVICE — POSTN ARMSLV LAT/TRACTION DISP

## (undated) DEVICE — EMERALD ARTHROSCOPY SHEET: Brand: CONVERTORS

## (undated) DEVICE — APPL CHLORAPREP HI/LITE 26ML ORNG

## (undated) DEVICE — ABL ASP APOLLO RF XL 90D

## (undated) DEVICE — GLV SURG SIGNATURE ESSENTIAL PF LTX SZ8.5

## (undated) DEVICE — PK ARTHSCP SHLDR TOWER 40

## (undated) DEVICE — GLV SURG BIOGEL LTX PF 7

## (undated) DEVICE — DRAPE,U/ SHT,SPLIT,PLAS,STERIL: Brand: MEDLINE

## (undated) DEVICE — TUBING SET, GRAVITY, 4-SPIKE

## (undated) DEVICE — BLD SHAVER BONECUTTER 4MM 13CM

## (undated) DEVICE — GOWN,NON-REINFORCED,SIRUS,SET IN SLV,XXL: Brand: MEDLINE

## (undated) DEVICE — DRSNG WND GZ CURAD OIL EMULSION 3X3IN STRL

## (undated) DEVICE — CANN TRPL DAM 7X7MM NO VLV

## (undated) DEVICE — GLV SURG BIOGEL LTX PF 8 1/2

## (undated) DEVICE — ARM SLING: Brand: DEROYAL